# Patient Record
Sex: FEMALE | Race: WHITE | NOT HISPANIC OR LATINO | Employment: FULL TIME | URBAN - METROPOLITAN AREA
[De-identification: names, ages, dates, MRNs, and addresses within clinical notes are randomized per-mention and may not be internally consistent; named-entity substitution may affect disease eponyms.]

---

## 2017-05-04 ENCOUNTER — RADIANT APPOINTMENT (OUTPATIENT)
Dept: ULTRASOUND IMAGING | Facility: CLINIC | Age: 47
End: 2017-05-04
Payer: COMMERCIAL

## 2017-05-04 ENCOUNTER — OFFICE VISIT (OUTPATIENT)
Dept: OBGYN | Facility: CLINIC | Age: 47
End: 2017-05-04
Payer: COMMERCIAL

## 2017-05-04 ENCOUNTER — TELEPHONE (OUTPATIENT)
Dept: OBGYN | Facility: CLINIC | Age: 47
End: 2017-05-04

## 2017-05-04 VITALS
DIASTOLIC BLOOD PRESSURE: 84 MMHG | HEIGHT: 68 IN | SYSTOLIC BLOOD PRESSURE: 124 MMHG | BODY MASS INDEX: 30.77 KG/M2 | WEIGHT: 203 LBS

## 2017-05-04 VITALS
WEIGHT: 203 LBS | DIASTOLIC BLOOD PRESSURE: 84 MMHG | HEIGHT: 68 IN | SYSTOLIC BLOOD PRESSURE: 124 MMHG | BODY MASS INDEX: 30.77 KG/M2

## 2017-05-04 DIAGNOSIS — N92.1 MENORRHAGIA WITH IRREGULAR CYCLE: ICD-10-CM

## 2017-05-04 DIAGNOSIS — D25.1 FIBROIDS, INTRAMURAL: Primary | ICD-10-CM

## 2017-05-04 DIAGNOSIS — N92.1 MENORRHAGIA WITH IRREGULAR CYCLE: Primary | ICD-10-CM

## 2017-05-04 LAB
FSH SERPL-ACNC: 9.5 IU/L
HGB BLD-MCNC: 12.4 G/DL (ref 11.7–15.7)

## 2017-05-04 PROCEDURE — 99212 OFFICE O/P EST SF 10 MIN: CPT | Mod: 25 | Performed by: NURSE PRACTITIONER

## 2017-05-04 PROCEDURE — 85018 HEMOGLOBIN: CPT | Performed by: OBSTETRICS & GYNECOLOGY

## 2017-05-04 PROCEDURE — 36415 COLL VENOUS BLD VENIPUNCTURE: CPT | Performed by: OBSTETRICS & GYNECOLOGY

## 2017-05-04 PROCEDURE — 76830 TRANSVAGINAL US NON-OB: CPT | Performed by: OBSTETRICS & GYNECOLOGY

## 2017-05-04 PROCEDURE — 83001 ASSAY OF GONADOTROPIN (FSH): CPT | Performed by: OBSTETRICS & GYNECOLOGY

## 2017-05-04 PROCEDURE — 99203 OFFICE O/P NEW LOW 30 MIN: CPT | Performed by: OBSTETRICS & GYNECOLOGY

## 2017-05-04 RX ORDER — LISINOPRIL 20 MG/1
TABLET ORAL
Refills: 3 | COMMUNITY
Start: 2016-10-25 | End: 2017-11-17

## 2017-05-04 ASSESSMENT — ANXIETY QUESTIONNAIRES
3. WORRYING TOO MUCH ABOUT DIFFERENT THINGS: NOT AT ALL
GAD7 TOTAL SCORE: 0
2. NOT BEING ABLE TO STOP OR CONTROL WORRYING: NOT AT ALL
5. BEING SO RESTLESS THAT IT IS HARD TO SIT STILL: NOT AT ALL
7. FEELING AFRAID AS IF SOMETHING AWFUL MIGHT HAPPEN: NOT AT ALL
IF YOU CHECKED OFF ANY PROBLEMS ON THIS QUESTIONNAIRE, HOW DIFFICULT HAVE THESE PROBLEMS MADE IT FOR YOU TO DO YOUR WORK, TAKE CARE OF THINGS AT HOME, OR GET ALONG WITH OTHER PEOPLE: NOT DIFFICULT AT ALL
1. FEELING NERVOUS, ANXIOUS, OR ON EDGE: NOT AT ALL
6. BECOMING EASILY ANNOYED OR IRRITABLE: NOT AT ALL

## 2017-05-04 ASSESSMENT — PATIENT HEALTH QUESTIONNAIRE - PHQ9: 5. POOR APPETITE OR OVEREATING: NOT AT ALL

## 2017-05-04 NOTE — PROGRESS NOTES
SUBJECTIVE:                                                   Yesi Vaughan is a 46 year old female who presents to clinic today for the following health issue(s):  Patient presents with:  Abnormal Uterine Bleeding: last night had heavy bleeding with clots and has been starting to have irregular cycles starting 6 months ago        HPI:  Patient is seen today for heavy vaginal bleeding.  Patient states that she is a week or 10 days late for her.  Her previous period was .  Her cycles have become more irregular in the past year.  She has had 2 months this past year where she has skipped her period.  She has not been sexually active for some time.  She states there is no possibility of a pregnancy.  This morning she woke with heavy vaginal bleeding.  Passage of clots.  She was changing her pads every 30 minutes.  She now is changing her pad every 1-2 hours and they are not saturated.  The patient does not typically have symptoms prior to the start of her menses.  Her cycles are generally once a month.  Her flow is normal for 4-5 days.  Her last pelvic and Pap smear was    Patient's last menstrual period was 2017..   Patient is sexually active,   Using none for contraception.    reports that she has never smoked. She does not have any smokeless tobacco history on file.  STD testing offered?  Declined  Health maintenance updated:  Followed by primary care provider      Today's PHQ-2 Score: No flowsheet data found.  Today's PHQ-9 Score:   PHQ-9 SCORE 2017   Total Score 1     Today's CRISSY-7 Score:   CRISSY-7 SCORE 2017   Total Score 0       Problem list and histories reviewed & adjusted, as indicated.  Additional history: as documented.    There is no problem list on file for this patient.    Past Surgical History:   Procedure Laterality Date     NO HISTORY OF SURGERY        Social History   Substance Use Topics     Smoking status: Never Smoker     Smokeless tobacco: Not on file      "Alcohol use Not on file      Problem (# of Occurrences) Relation (Name,Age of Onset)    DIABETES (1) Maternal Grandfather    Hypertension (1) Mother    Lung Cancer (1) Father    OSTEOPOROSIS (1) Father    Skin Cancer (1) Mother            Current Outpatient Prescriptions   Medication Sig     Multiple Vitamins-Minerals (MULTIVITAMIN PO)      lisinopril (PRINIVIL/ZESTRIL) 20 MG tablet TAKE 1 TAB BY MOUTH DAILY.     No current facility-administered medications for this visit.      Allergies no known allergies    ROS:  12 point review of systems negative other than symptoms noted below.  Eyes: occular migraines  Genitourinary: Heavy Bleeding with Period, Irregular Menses, Urgency and burning with urination  Endocrine: Decreased Libido    OBJECTIVE:     /84  Ht 5' 8\" (1.727 m)  Wt 203 lb (92.1 kg)  LMP 05/02/2017  BMI 30.87 kg/m2  Body mass index is 30.87 kg/(m^2).    Exam:  Constitutional:  Appearance: Well nourished, well developed alert, in no acute distress  Neurologic/Psychiatric:  Mental Status:  Oriented X3   Pelvic  Exam deferred secondary to bleeding    In-Clinic Test Results:  No results found for this or any previous visit (from the past 24 hour(s)).    ASSESSMENT/PLAN:                                                        ICD-10-CM    1. Menorrhagia with irregular cycle N92.1 Hemoglobin     Follicle stimulating hormone     US Transvaginal Non OB           Plan: Patient will return to clinic for ultrasound examination.    He Hooker MD  [facility] [place]  "

## 2017-05-04 NOTE — PROGRESS NOTES
"    SUBJECTIVE:                                                   Yesi Vaughan is a 46 year old female who presents to clinic today for the following health issue(s):  Patient presents with:  Ultrasound: f/u cysts      HPI:  Pt with spotting after menses. F/u of ovarian cysts as well as hx of uterine fibroids    Patient's last menstrual period was 2017..   Patient is sexually active, .  Using none for contraception.    reports that she has never smoked. She does not have any smokeless tobacco history on file.    STD testing offered?  Declined    Health maintenance updated:  yes    Today's PHQ-2 Score: No flowsheet data found.  Today's PHQ-9 Score:   PHQ-9 SCORE 2017   Total Score 1     Today's CRISSY-7 Score:   CRISSY-7 SCORE 2017   Total Score 0       Problem list and histories reviewed & adjusted, as indicated.  Additional history: as documented.    There is no problem list on file for this patient.    Past Surgical History:   Procedure Laterality Date     NO HISTORY OF SURGERY        Social History   Substance Use Topics     Smoking status: Never Smoker     Smokeless tobacco: Not on file     Alcohol use Not on file      Problem (# of Occurrences) Relation (Name,Age of Onset)    DIABETES (1) Maternal Grandfather    Hypertension (1) Mother    Lung Cancer (1) Father    OSTEOPOROSIS (1) Father    Skin Cancer (1) Mother            Current Outpatient Prescriptions   Medication Sig     lisinopril (PRINIVIL/ZESTRIL) 20 MG tablet TAKE 1 TAB BY MOUTH DAILY.     Multiple Vitamins-Minerals (MULTIVITAMIN PO)      No current facility-administered medications for this visit.      No Known Allergies    ROS:  12 point review of systems negative other than symptoms noted below.    OBJECTIVE:     /84  Ht 5' 8\" (1.727 m)  Wt 203 lb (92.1 kg)  LMP 2017  BMI 30.87 kg/m2  Body mass index is 30.87 kg/(m^2).    Exam:  Constitutional:  Appearance: Well nourished, well developed alert, in no acute " distress  Neurologic/Psychiatric:  Mental Status:  Oriented X3   No Pelvic Exam performed   See exam by Dr. Hooker this morning  In-Clinic Test Results:  Results for orders placed or performed in visit on 05/04/17 (from the past 24 hour(s))   Hemoglobin   Result Value Ref Range    Hemoglobin 12.4 11.7 - 15.7 g/dL       ASSESSMENT/PLAN:                                                        ICD-10-CM    1. Fibroids, intramural D25.1        There are no Patient Instructions on file for this visit.    Pt with intramural fibroid. She is spotting when she uses the restroom. She does have a hx of fibroid from 2006 ultrasound where 2 fibroids were noted. Discussed pathophysiology and conservative vs aggressive approach. Pt to wait for FSH to come back. Unlikely that she is menopausal. Hemoglobin normal today. Will wait to hear back from JOHANN about FSH and decide what to do with fibroid.     CASTILLO Ramirez CNP  [facility]

## 2017-05-04 NOTE — MR AVS SNAPSHOT
"              After Visit Summary   5/4/2017    Yesi Vaughan    MRN: [id number]           Patient Information     Date Of Birth          1970        Visit Information        Provider Department      5/4/2017 11:30 AM He Hooker MD [facility]        Today's Diagnoses     Menorrhagia with irregular cycle    -  1       Follow-ups after your visit        Future tests that were ordered for you today     Open Future Orders        Priority Expected Expires Ordered    US Transvaginal Non OB Routine  5/5/2018 5/4/2017            Who to contact     If you have questions or need follow up information about today's clinic visit or your schedule please contact [facility] directly at [phone].  Normal or non-critical lab and imaging results will be communicated to you by MyChart, letter or phone within 4 business days after the clinic has received the results. If you do not hear from us within 7 days, please contact the clinic through [employer] or phone. If you have a critical or abnormal lab result, we will notify you by phone as soon as possible.  Submit refill requests through [employer] or call your pharmacy and they will forward the refill request to us. Please allow 3 business days for your refill to be completed.          Additional Information About Your Visit        MyChart Information     [employer] lets you send messages to your doctor, view your test results, renew your prescriptions, schedule appointments and more. To sign up, go to [email or web address] . Click on \"Log in\" on the left side of the screen, which will take you to the Welcome page. Then click on \"Sign up Now\" on the right side of the page.     You will be asked to enter the access code listed below, as well as some personal information. Please follow the directions to create your username and password.     Your access code is: [email or web address]  Expires: 8/2/2017 12:10 PM     Your access code " "will  in 90 days. If you need help or a new code, please call your [facility] clinic or [phone].        Care EveryWhere ID     This is your Care EveryWhere ID. This could be used by other organizations to access your [facility] medical records  [id number]        Your Vitals Were     Height Last Period BMI (Body Mass Index)             5' 8\" (1.727 m) 2017 30.87 kg/m2          Blood Pressure from Last 3 Encounters:   17 124/84    Weight from Last 3 Encounters:   17 203 lb (92.1 kg)              We Performed the Following     Follicle stimulating hormone     Hemoglobin        Primary Care Provider Office Phone # Fax #    Lyubov Faith [phone]       [facility] SM[facility] [address] [address]        Thank you!     Thank you for choosing [facility] [place]  for your care. Our goal is always to provide you with excellent care. Hearing back from our patients is one way we can continue to improve our services. Please take a few minutes to complete the written survey that you may receive in the mail after your visit with us. Thank you!             Your Updated Medication List - Protect others around you: Learn how to safely use, store and throw away your medicines at www.disposemymeds.org.          This list is accurate as of: 17 12:10 PM.  Always use your most recent med list.                   Brand Name Dispense Instructions for use    lisinopril 20 MG tablet    PRINIVIL/ZESTRIL     TAKE 1 TAB BY MOUTH DAILY.       MULTIVITAMIN PO            "

## 2017-05-05 ASSESSMENT — ANXIETY QUESTIONNAIRES: GAD7 TOTAL SCORE: 0

## 2017-05-05 ASSESSMENT — PATIENT HEALTH QUESTIONNAIRE - PHQ9: SUM OF ALL RESPONSES TO PHQ QUESTIONS 1-9: 1

## 2017-05-08 ENCOUNTER — OFFICE VISIT (OUTPATIENT)
Dept: OBGYN | Facility: CLINIC | Age: 47
End: 2017-05-08
Payer: COMMERCIAL

## 2017-05-08 VITALS
HEIGHT: 68 IN | WEIGHT: 203 LBS | DIASTOLIC BLOOD PRESSURE: 74 MMHG | SYSTOLIC BLOOD PRESSURE: 122 MMHG | BODY MASS INDEX: 30.77 KG/M2

## 2017-05-08 DIAGNOSIS — N92.0 MENORRHAGIA WITH REGULAR CYCLE: Primary | ICD-10-CM

## 2017-05-08 DIAGNOSIS — Z01.818 PRE-OP EXAM: ICD-10-CM

## 2017-05-08 DIAGNOSIS — R93.89 THICKENED ENDOMETRIUM: ICD-10-CM

## 2017-05-08 DIAGNOSIS — D25.1 INTRAMURAL LEIOMYOMA OF UTERUS: ICD-10-CM

## 2017-05-08 PROCEDURE — 99214 OFFICE O/P EST MOD 30 MIN: CPT | Performed by: OBSTETRICS & GYNECOLOGY

## 2017-05-08 RX ORDER — MISOPROSTOL 200 UG/1
200 TABLET ORAL ONCE
Qty: 1 TABLET | Refills: 0 | Status: SHIPPED | OUTPATIENT
Start: 2017-05-08 | End: 2017-05-08

## 2017-05-08 NOTE — PROGRESS NOTES
SUBJECTIVE:                                                   Yesi Vaughan is a 46 year old female who presents to clinic today for the following health issue(s):  Patient presents with:  Consult: Discuss  ultrasound      HPI:  Patient returns today for follow-up after her ultrasound and FSH.  Her FSH was 9.5.  The ultrasound showed a thickened endometrium 16.9 mm.  She had a small uterine fibroid measuring less than 2 cm.  Her bleeding has slowed down considerably.    Patient's last menstrual period was 2017..   Patient is sexually active, .  Using none for contraception.    reports that she has never smoked. She does not have any smokeless tobacco history on file.    STD testing offered?  Declined    Health maintenance updated:  no    Today's PHQ-2 Score: No flowsheet data found.  Today's PHQ-9 Score:   PHQ-9 SCORE 2017   Total Score 1     Today's CRISSY-7 Score:   CRISSY-7 SCORE 2017   Total Score 0       Problem list and histories reviewed & adjusted, as indicated.  Additional history: as documented.    There is no problem list on file for this patient.    Past Surgical History:   Procedure Laterality Date     NO HISTORY OF SURGERY        Social History   Substance Use Topics     Smoking status: Never Smoker     Smokeless tobacco: Not on file     Alcohol use Not on file      Problem (# of Occurrences) Relation (Name,Age of Onset)    DIABETES (1) Maternal Grandfather    Hypertension (1) Mother    Lung Cancer (1) Father    OSTEOPOROSIS (1) Father    Skin Cancer (1) Mother            Current Outpatient Prescriptions   Medication Sig     misoprostol (CYTOTEC) 200 MCG tablet Take 1 tablet (200 mcg) by mouth once for 1 dose     lisinopril (PRINIVIL/ZESTRIL) 20 MG tablet TAKE 1 TAB BY MOUTH DAILY.     Multiple Vitamins-Minerals (MULTIVITAMIN PO)      No current facility-administered medications for this visit.      No Known Allergies    ROS:  12 point review of systems negative other  "than symptoms noted below.    OBJECTIVE:     /74  Ht 5' 8\" (1.727 m)  Wt 203 lb (92.1 kg)  LMP 05/02/2017  BMI 30.87 kg/m2  Body mass index is 30.87 kg/(m^2).    Exam:  Constitutional:  Appearance: Well nourished, well developed alert, in no acute distress  Neck:  Lymph Nodes:  No lymphadenopathy present; Thyroid:  Gland size normal, nontender, no nodules or masses present on palpation  Chest:  Respiratory Effort:  Breathing unlabored  Cardiovascular: Heart: Auscultation:  Regular rate, normal rhythm, no murmurs present  Neurologic/Psychiatric:  Mental Status:  Oriented X3   No Pelvic Exam performed     In-Clinic Test Results:  No results found for this or any previous visit (from the past 24 hour(s)).    ASSESSMENT/PLAN:                                                        ICD-10-CM    1. Menorrhagia with regular cycle N92.0 misoprostol (CYTOTEC) 200 MCG tablet   2. Intramural leiomyoma of uterus D25.1    3. Thickened endometrium R93.8    4. Pre-op exam Z01.818            Plan: The patient be scheduled for surgery.  I reviewed the procedure with the patient.    He Hooker MD  [facility]    Amount of time needed for the procedure:  1 hour   Expected time off from work:  1 day  Surgeon:  He Hooker MD  Surgical Procedure:  Hysteroscopy with Myosure  Preop Needed:  Yes with  Myself  Location for surgery to performed:   Surgery Outpatient  Anesthesia:  General   No Known Allergies    DIAGNOSIS:  Thickened endometrium, menorrhagia    Special Instructions:            "

## 2017-05-08 NOTE — MR AVS SNAPSHOT
"              After Visit Summary   2017    Yesi Vaughan    MRN: [id number]           Patient Information     Date Of Birth          1970        Visit Information        Provider Department      2017 4:15 PM He Hooker MD [facility]        Today's Diagnoses     Menorrhagia with regular cycle    -  1    Intramural leiomyoma of uterus        Thickened endometrium        Pre-op exam           Follow-ups after your visit        Who to contact     If you have questions or need follow up information about today's clinic visit or your schedule please contact [facility] directly at [phone].  Normal or non-critical lab and imaging results will be communicated to you by [employer], letter or phone within 4 business days after the clinic has received the results. If you do not hear from us within 7 days, please contact the clinic through [employer] or phone. If you have a critical or abnormal lab result, we will notify you by phone as soon as possible.  Submit refill requests through [employer] or call your pharmacy and they will forward the refill request to us. Please allow 3 business days for your refill to be completed.          Additional Information About Your Visit        MyChart Information     [employer] lets you send messages to your doctor, view your test results, renew your prescriptions, schedule appointments and more. To sign up, go to [email or web address] . Click on \"Log in\" on the left side of the screen, which will take you to the Welcome page. Then click on \"Sign up Now\" on the right side of the page.     You will be asked to enter the access code listed below, as well as some personal information. Please follow the directions to create your username and password.     Your access code is: [email or web address]  Expires: 2017 12:10 PM     Your access code will  in 90 days. If you need help or a new code, please call your [facility] clinic " "or [phone].        Care EveryWhere ID     This is your Care EveryWhere ID. This could be used by other organizations to access your [facility] medical records  [id number]        Your Vitals Were     Height Last Period BMI (Body Mass Index)             5' 8\" (1.727 m) 05/02/2017 30.87 kg/m2          Blood Pressure from Last 3 Encounters:   05/08/17 122/74   05/04/17 124/84   05/04/17 124/84    Weight from Last 3 Encounters:   05/08/17 203 lb (92.1 kg)   05/04/17 203 lb (92.1 kg)   05/04/17 203 lb (92.1 kg)              Today, you had the following     No orders found for display         Today's Medication Changes          These changes are accurate as of: 5/8/17  5:02 PM.  If you have any questions, ask your nurse or doctor.               Start taking these medicines.        Dose/Directions    misoprostol 200 MCG tablet   Commonly known as:  CYTOTEC   Used for:  Menorrhagia with regular cycle   Started by:  He Hooker MD        Dose:  200 mcg   Take 1 tablet (200 mcg) by mouth once for 1 dose   Quantity:  1 tablet   Refills:  0            Where to get your medicines      These medications were sent to [place] [address] [place] [facility]  [address]  [address] [place] [address]     Phone:  [phone]     misoprostol 200 MCG tablet                Primary Care Provider Office Phone # Fax #    Lyubov Faith [phone]       [facility] [place] [address] [place] [address]        Thank you!     Thank you for choosing [facility] [place]  for your care. Our goal is always to provide you with excellent care. Hearing back from our patients is one way we can continue to improve our services. Please take a few minutes to complete the written survey that you may receive in the mail after your visit with us. Thank you!             Your Updated Medication List - Protect others around you: Learn how to safely use, store and throw away your medicines at " www.disposemymeds.org.          This list is accurate as of: 5/8/17  5:02 PM.  Always use your most recent med list.                   Brand Name Dispense Instructions for use    lisinopril 20 MG tablet    PRINIVIL/ZESTRIL     TAKE 1 TAB BY MOUTH DAILY.       misoprostol 200 MCG tablet    CYTOTEC    1 tablet    Take 1 tablet (200 mcg) by mouth once for 1 dose       MULTIVITAMIN PO

## 2017-05-08 NOTE — Clinical Note
Please abstract the following data from this visit with this patient into the appropriate field in Epic:  Mammogram done on this date: 06/01/2016 (approximately), by this group: [facility], results were normal.  Pap smear done on this date: 10/01/2016 (approximately), by this group: [facility], results were normal.

## 2017-05-10 ENCOUNTER — TELEPHONE (OUTPATIENT)
Dept: OBGYN | Facility: CLINIC | Age: 47
End: 2017-05-10

## 2017-05-10 NOTE — TELEPHONE ENCOUNTER
LVM for pt to call and sched SX    Noemi Padilla  Surgery Scheduler        Amount of time needed for the procedure: 1 hour   Expected time off from work: 1 day  Surgeon: He Hooker MD  Surgical Procedure:  Hysteroscopy with Myosure  Preop Needed: Yes with Myself  Location for surgery to performed: Surgery Outpatient  Anesthesia: General  No Known Allergies     DIAGNOSIS: Thickened endometrium, menorrhagia     Special Instructions:

## 2017-05-10 NOTE — TELEPHONE ENCOUNTER
Patient surgery scheduled on 5/17/2017 at 7:30am Check in 5:30am  Location for surgery to performed:   Surgery Outpatient  Scheduled by Cassie 5/10/2017     Information Packet given :Yes: MAILED 5/10/2017    CPT codes given: Yes    60079        Consents signed? N/A  Rep Informed :N/A    PREOP DATE :  5/8/2017  In Epic :Yes    On Spreadsheet :Yes    On Calendar EB  :No    In [place] Calendar JOAHNN  :Yes    Assist NA   Assist in Epic NA  Assist Notified as needed :No       Noemi Padilla  Surgery Scheduler

## 2017-05-12 NOTE — H&P (VIEW-ONLY)
SUBJECTIVE:                                                   Yesi Vaughan is a 46 year old female who presents to clinic today for the following health issue(s):  Patient presents with:  Consult: Discuss  ultrasound      HPI:  Patient returns today for follow-up after her ultrasound and FSH.  Her FSH was 9.5.  The ultrasound showed a thickened endometrium 16.9 mm.  She had a small uterine fibroid measuring less than 2 cm.  Her bleeding has slowed down considerably.    Patient's last menstrual period was 2017..   Patient is sexually active, .  Using none for contraception.    reports that she has never smoked. She does not have any smokeless tobacco history on file.    STD testing offered?  Declined    Health maintenance updated:  no    Today's PHQ-2 Score: No flowsheet data found.  Today's PHQ-9 Score:   PHQ-9 SCORE 2017   Total Score 1     Today's CRISSY-7 Score:   CRISSY-7 SCORE 2017   Total Score 0       Problem list and histories reviewed & adjusted, as indicated.  Additional history: as documented.    There is no problem list on file for this patient.    Past Surgical History:   Procedure Laterality Date     NO HISTORY OF SURGERY        Social History   Substance Use Topics     Smoking status: Never Smoker     Smokeless tobacco: Not on file     Alcohol use Not on file      Problem (# of Occurrences) Relation (Name,Age of Onset)    DIABETES (1) Maternal Grandfather    Hypertension (1) Mother    Lung Cancer (1) Father    OSTEOPOROSIS (1) Father    Skin Cancer (1) Mother            Current Outpatient Prescriptions   Medication Sig     misoprostol (CYTOTEC) 200 MCG tablet Take 1 tablet (200 mcg) by mouth once for 1 dose     lisinopril (PRINIVIL/ZESTRIL) 20 MG tablet TAKE 1 TAB BY MOUTH DAILY.     Multiple Vitamins-Minerals (MULTIVITAMIN PO)      No current facility-administered medications for this visit.      No Known Allergies    ROS:  12 point review of systems negative other  "than symptoms noted below.    OBJECTIVE:     /74  Ht 5' 8\" (1.727 m)  Wt 203 lb (92.1 kg)  LMP 05/02/2017  BMI 30.87 kg/m2  Body mass index is 30.87 kg/(m^2).    Exam:  Constitutional:  Appearance: Well nourished, well developed alert, in no acute distress  Neck:  Lymph Nodes:  No lymphadenopathy present; Thyroid:  Gland size normal, nontender, no nodules or masses present on palpation  Chest:  Respiratory Effort:  Breathing unlabored  Cardiovascular: Heart: Auscultation:  Regular rate, normal rhythm, no murmurs present  Neurologic/Psychiatric:  Mental Status:  Oriented X3   No Pelvic Exam performed     In-Clinic Test Results:  No results found for this or any previous visit (from the past 24 hour(s)).    ASSESSMENT/PLAN:                                                        ICD-10-CM    1. Menorrhagia with regular cycle N92.0 misoprostol (CYTOTEC) 200 MCG tablet   2. Intramural leiomyoma of uterus D25.1    3. Thickened endometrium R93.8    4. Pre-op exam Z01.818            Plan: The patient be scheduled for surgery.  I reviewed the procedure with the patient.    He Hooker MD  [facility]    Amount of time needed for the procedure:  1 hour   Expected time off from work:  1 day  Surgeon:  He Hooker MD  Surgical Procedure:  Hysteroscopy with Myosure  Preop Needed:  Yes with  Myself  Location for surgery to performed:   Surgery Outpatient  Anesthesia:  General   No Known Allergies    DIAGNOSIS:  Thickened endometrium, menorrhagia    Special Instructions:            "

## 2017-05-17 ENCOUNTER — HOSPITAL ENCOUNTER (OUTPATIENT)
Facility: CLINIC | Age: 47
Discharge: HOME | End: 2017-05-17
Attending: OBSTETRICS & GYNECOLOGY | Admitting: OBSTETRICS & GYNECOLOGY
Payer: COMMERCIAL

## 2017-05-17 ENCOUNTER — ANESTHESIA EVENT (OUTPATIENT)
Dept: SURGERY | Facility: CLINIC | Age: 47
End: 2017-05-17
Payer: COMMERCIAL

## 2017-05-17 ENCOUNTER — ANESTHESIA (OUTPATIENT)
Dept: SURGERY | Facility: CLINIC | Age: 47
End: 2017-05-17
Payer: COMMERCIAL

## 2017-05-17 VITALS
HEIGHT: 69 IN | RESPIRATION RATE: 14 BRPM | DIASTOLIC BLOOD PRESSURE: 73 MMHG | HEART RATE: 77 BPM | WEIGHT: 209 LBS | BODY MASS INDEX: 30.96 KG/M2 | OXYGEN SATURATION: 100 % | TEMPERATURE: 94.6 F | SYSTOLIC BLOOD PRESSURE: 116 MMHG

## 2017-05-17 DIAGNOSIS — G89.18 ACUTE POST-OPERATIVE PAIN: Primary | ICD-10-CM

## 2017-05-17 LAB — HCG SERPL QL: NEGATIVE

## 2017-05-17 PROCEDURE — 25000128 H RX IP 250 OP 636: Performed by: NURSE ANESTHETIST, CERTIFIED REGISTERED

## 2017-05-17 PROCEDURE — 36000056 ZZH SURGERY LEVEL 3 1ST 30 MIN: Performed by: OBSTETRICS & GYNECOLOGY

## 2017-05-17 PROCEDURE — 40000170 ZZH STATISTIC PRE-PROCEDURE ASSESSMENT II: Performed by: OBSTETRICS & GYNECOLOGY

## 2017-05-17 PROCEDURE — 84703 CHORIONIC GONADOTROPIN ASSAY: CPT | Performed by: ANESTHESIOLOGY

## 2017-05-17 PROCEDURE — 37000009 ZZH ANESTHESIA TECHNICAL FEE, EACH ADDTL 15 MIN: Performed by: OBSTETRICS & GYNECOLOGY

## 2017-05-17 PROCEDURE — 25000125 ZZHC RX 250: Performed by: NURSE ANESTHETIST, CERTIFIED REGISTERED

## 2017-05-17 PROCEDURE — 88305 TISSUE EXAM BY PATHOLOGIST: CPT | Performed by: OBSTETRICS & GYNECOLOGY

## 2017-05-17 PROCEDURE — 71000027 ZZH RECOVERY PHASE 2 EACH 15 MINS: Performed by: OBSTETRICS & GYNECOLOGY

## 2017-05-17 PROCEDURE — 25000128 H RX IP 250 OP 636: Performed by: OBSTETRICS & GYNECOLOGY

## 2017-05-17 PROCEDURE — 58558 HYSTEROSCOPY BIOPSY: CPT | Performed by: OBSTETRICS & GYNECOLOGY

## 2017-05-17 PROCEDURE — 27210794 ZZH OR GENERAL SUPPLY STERILE: Performed by: OBSTETRICS & GYNECOLOGY

## 2017-05-17 PROCEDURE — 36000058 ZZH SURGERY LEVEL 3 EA 15 ADDTL MIN: Performed by: OBSTETRICS & GYNECOLOGY

## 2017-05-17 PROCEDURE — 71000013 ZZH RECOVERY PHASE 1 LEVEL 1 EA ADDTL HR: Performed by: OBSTETRICS & GYNECOLOGY

## 2017-05-17 PROCEDURE — 36415 COLL VENOUS BLD VENIPUNCTURE: CPT | Performed by: ANESTHESIOLOGY

## 2017-05-17 PROCEDURE — 88305 TISSUE EXAM BY PATHOLOGIST: CPT | Mod: 26 | Performed by: OBSTETRICS & GYNECOLOGY

## 2017-05-17 PROCEDURE — 71000012 ZZH RECOVERY PHASE 1 LEVEL 1 FIRST HR: Performed by: OBSTETRICS & GYNECOLOGY

## 2017-05-17 PROCEDURE — 37000008 ZZH ANESTHESIA TECHNICAL FEE, 1ST 30 MIN: Performed by: OBSTETRICS & GYNECOLOGY

## 2017-05-17 RX ORDER — SODIUM CHLORIDE, SODIUM LACTATE, POTASSIUM CHLORIDE, CALCIUM CHLORIDE 600; 310; 30; 20 MG/100ML; MG/100ML; MG/100ML; MG/100ML
INJECTION, SOLUTION INTRAVENOUS CONTINUOUS
Status: DISCONTINUED | OUTPATIENT
Start: 2017-05-17 | End: 2017-05-17 | Stop reason: HOSPADM

## 2017-05-17 RX ORDER — CEFAZOLIN SODIUM 2 G/100ML
2 INJECTION, SOLUTION INTRAVENOUS
Status: COMPLETED | OUTPATIENT
Start: 2017-05-17 | End: 2017-05-17

## 2017-05-17 RX ORDER — ONDANSETRON 2 MG/ML
INJECTION INTRAMUSCULAR; INTRAVENOUS PRN
Status: DISCONTINUED | OUTPATIENT
Start: 2017-05-17 | End: 2017-05-17

## 2017-05-17 RX ORDER — HYDROCODONE BITARTRATE AND ACETAMINOPHEN 5; 325 MG/1; MG/1
1-2 TABLET ORAL EVERY 4 HOURS PRN
Qty: 20 TABLET | Refills: 0 | Status: SHIPPED | OUTPATIENT
Start: 2017-05-17 | End: 2017-06-08

## 2017-05-17 RX ORDER — FENTANYL CITRATE 50 UG/ML
INJECTION, SOLUTION INTRAMUSCULAR; INTRAVENOUS PRN
Status: DISCONTINUED | OUTPATIENT
Start: 2017-05-17 | End: 2017-05-17

## 2017-05-17 RX ORDER — IBUPROFEN 600 MG/1
600 TABLET, FILM COATED ORAL
Status: DISCONTINUED | OUTPATIENT
Start: 2017-05-17 | End: 2017-05-17 | Stop reason: HOSPADM

## 2017-05-17 RX ORDER — DEXAMETHASONE SODIUM PHOSPHATE 4 MG/ML
INJECTION, SOLUTION INTRA-ARTICULAR; INTRALESIONAL; INTRAMUSCULAR; INTRAVENOUS; SOFT TISSUE PRN
Status: DISCONTINUED | OUTPATIENT
Start: 2017-05-17 | End: 2017-05-17

## 2017-05-17 RX ORDER — LIDOCAINE HYDROCHLORIDE 20 MG/ML
INJECTION, SOLUTION INFILTRATION; PERINEURAL PRN
Status: DISCONTINUED | OUTPATIENT
Start: 2017-05-17 | End: 2017-05-17

## 2017-05-17 RX ORDER — EPHEDRINE SULFATE 50 MG/ML
INJECTION, SOLUTION INTRAMUSCULAR; INTRAVENOUS; SUBCUTANEOUS PRN
Status: DISCONTINUED | OUTPATIENT
Start: 2017-05-17 | End: 2017-05-17

## 2017-05-17 RX ORDER — KETOROLAC TROMETHAMINE 30 MG/ML
INJECTION, SOLUTION INTRAMUSCULAR; INTRAVENOUS PRN
Status: DISCONTINUED | OUTPATIENT
Start: 2017-05-17 | End: 2017-05-17

## 2017-05-17 RX ORDER — FENTANYL CITRATE 50 UG/ML
25-50 INJECTION, SOLUTION INTRAMUSCULAR; INTRAVENOUS
Status: DISCONTINUED | OUTPATIENT
Start: 2017-05-17 | End: 2017-05-17 | Stop reason: HOSPADM

## 2017-05-17 RX ORDER — CEFAZOLIN SODIUM 1 G/3ML
1 INJECTION, POWDER, FOR SOLUTION INTRAMUSCULAR; INTRAVENOUS SEE ADMIN INSTRUCTIONS
Status: DISCONTINUED | OUTPATIENT
Start: 2017-05-17 | End: 2017-05-17 | Stop reason: HOSPADM

## 2017-05-17 RX ORDER — PROPOFOL 10 MG/ML
INJECTION, EMULSION INTRAVENOUS CONTINUOUS PRN
Status: DISCONTINUED | OUTPATIENT
Start: 2017-05-17 | End: 2017-05-17

## 2017-05-17 RX ORDER — KETOROLAC TROMETHAMINE 30 MG/ML
30 INJECTION, SOLUTION INTRAMUSCULAR; INTRAVENOUS EVERY 6 HOURS PRN
Status: DISCONTINUED | OUTPATIENT
Start: 2017-05-17 | End: 2017-05-17 | Stop reason: HOSPADM

## 2017-05-17 RX ORDER — FENTANYL CITRATE 50 UG/ML
25-50 INJECTION, SOLUTION INTRAMUSCULAR; INTRAVENOUS EVERY 5 MIN PRN
Status: DISCONTINUED | OUTPATIENT
Start: 2017-05-17 | End: 2017-05-17 | Stop reason: HOSPADM

## 2017-05-17 RX ORDER — PROPOFOL 10 MG/ML
INJECTION, EMULSION INTRAVENOUS PRN
Status: DISCONTINUED | OUTPATIENT
Start: 2017-05-17 | End: 2017-05-17

## 2017-05-17 RX ORDER — ONDANSETRON 2 MG/ML
4 INJECTION INTRAMUSCULAR; INTRAVENOUS EVERY 30 MIN PRN
Status: DISCONTINUED | OUTPATIENT
Start: 2017-05-17 | End: 2017-05-17 | Stop reason: HOSPADM

## 2017-05-17 RX ORDER — SODIUM CHLORIDE, SODIUM LACTATE, POTASSIUM CHLORIDE, CALCIUM CHLORIDE 600; 310; 30; 20 MG/100ML; MG/100ML; MG/100ML; MG/100ML
INJECTION, SOLUTION INTRAVENOUS CONTINUOUS PRN
Status: DISCONTINUED | OUTPATIENT
Start: 2017-05-17 | End: 2017-05-17

## 2017-05-17 RX ORDER — NALOXONE HYDROCHLORIDE 0.4 MG/ML
.1-.4 INJECTION, SOLUTION INTRAMUSCULAR; INTRAVENOUS; SUBCUTANEOUS
Status: DISCONTINUED | OUTPATIENT
Start: 2017-05-17 | End: 2017-05-17 | Stop reason: HOSPADM

## 2017-05-17 RX ORDER — HYDROMORPHONE HYDROCHLORIDE 1 MG/ML
.3-.5 INJECTION, SOLUTION INTRAMUSCULAR; INTRAVENOUS; SUBCUTANEOUS EVERY 10 MIN PRN
Status: DISCONTINUED | OUTPATIENT
Start: 2017-05-17 | End: 2017-05-17 | Stop reason: HOSPADM

## 2017-05-17 RX ORDER — IBUPROFEN 600 MG/1
600 TABLET, FILM COATED ORAL EVERY 6 HOURS PRN
Qty: 30 TABLET | Refills: 0 | Status: SHIPPED | OUTPATIENT
Start: 2017-05-17 | End: 2017-12-13

## 2017-05-17 RX ORDER — MEPERIDINE HYDROCHLORIDE 25 MG/ML
12.5 INJECTION INTRAMUSCULAR; INTRAVENOUS; SUBCUTANEOUS
Status: DISCONTINUED | OUTPATIENT
Start: 2017-05-17 | End: 2017-05-17 | Stop reason: HOSPADM

## 2017-05-17 RX ORDER — ONDANSETRON 4 MG/1
4 TABLET, ORALLY DISINTEGRATING ORAL EVERY 30 MIN PRN
Status: DISCONTINUED | OUTPATIENT
Start: 2017-05-17 | End: 2017-05-17 | Stop reason: HOSPADM

## 2017-05-17 RX ORDER — HYDROCODONE BITARTRATE AND ACETAMINOPHEN 5; 325 MG/1; MG/1
1-2 TABLET ORAL
Status: DISCONTINUED | OUTPATIENT
Start: 2017-05-17 | End: 2017-05-17 | Stop reason: HOSPADM

## 2017-05-17 RX ADMIN — DEXAMETHASONE SODIUM PHOSPHATE 4 MG: 4 INJECTION, SOLUTION INTRA-ARTICULAR; INTRALESIONAL; INTRAMUSCULAR; INTRAVENOUS; SOFT TISSUE at 07:38

## 2017-05-17 RX ADMIN — Medication 7.5 MG: at 07:44

## 2017-05-17 RX ADMIN — FENTANYL CITRATE 50 MCG: 50 INJECTION, SOLUTION INTRAMUSCULAR; INTRAVENOUS at 07:42

## 2017-05-17 RX ADMIN — PROPOFOL 200 MCG/KG/MIN: 10 INJECTION, EMULSION INTRAVENOUS at 07:30

## 2017-05-17 RX ADMIN — PROPOFOL 200 MG: 10 INJECTION, EMULSION INTRAVENOUS at 07:30

## 2017-05-17 RX ADMIN — LIDOCAINE HYDROCHLORIDE 100 MG: 20 INJECTION, SOLUTION INFILTRATION; PERINEURAL at 07:30

## 2017-05-17 RX ADMIN — KETOROLAC TROMETHAMINE 30 MG: 30 INJECTION, SOLUTION INTRAMUSCULAR at 07:53

## 2017-05-17 RX ADMIN — SODIUM CHLORIDE, POTASSIUM CHLORIDE, SODIUM LACTATE AND CALCIUM CHLORIDE: 600; 310; 30; 20 INJECTION, SOLUTION INTRAVENOUS at 07:30

## 2017-05-17 RX ADMIN — ONDANSETRON 4 MG: 2 INJECTION INTRAMUSCULAR; INTRAVENOUS at 07:38

## 2017-05-17 RX ADMIN — MIDAZOLAM HYDROCHLORIDE 2 MG: 1 INJECTION, SOLUTION INTRAMUSCULAR; INTRAVENOUS at 07:30

## 2017-05-17 RX ADMIN — CEFAZOLIN SODIUM 2 G: 2 INJECTION, SOLUTION INTRAVENOUS at 07:34

## 2017-05-17 RX ADMIN — FENTANYL CITRATE 50 MCG: 50 INJECTION, SOLUTION INTRAMUSCULAR; INTRAVENOUS at 07:30

## 2017-05-17 NOTE — DISCHARGE INSTRUCTIONS
Same Day Surgery Discharge Instructions for  Sedation and General Anesthesia       It's not unusual to feel dizzy, light-headed or faint for up to 24 hours after surgery or while taking pain medication.  If you have these symptoms: sit for a few minutes before standing and have someone assist you when you get up to walk or use the bathroom.      You should rest and relax for the next 24 hours. We recommend you make arrangements to have an adult stay with you for at least 24 hours after your discharge.  Avoid hazardous and strenuous activity.      DO NOT DRIVE any vehicle or operate mechanical equipment for 24 hours following the end of your surgery.  Even though you may feel normal, your reactions may be affected by the medication you have received.      Do not drink alcoholic beverages for 24 hours following surgery.       Slowly progress to your regular diet as you feel able. It's not unusual to feel nauseated and/or vomit after receiving anesthesia.  If you develop these symptoms, drink clear liquids (apple juice, ginger ale, broth, 7-up, etc. ) until you feel better.  If your nausea and vomiting persists for 24 hours, please notify your surgeon.        All narcotic pain medications, along with inactivity and anesthesia, can cause constipation. Drinking plenty of liquids and increasing fiber intake will help.      For any questions of a medical nature, call your surgeon.      Do not make important decisions for 24 hours.      If you had general anesthesia, you may have a sore throat for a couple of days related to the breathing tube used during surgery.  You may use Cepacol lozenges to help with this discomfort.  If it worsens or if you develop a fever, contact your surgeon.       If you feel your pain is not well managed with the pain medications prescribed by your surgeon, please contact your surgeon's office to let them know so they can address your concerns.       While you were at the hospital today you  received Toradol, an antiinflammatory medication similar to Ibuprofen.  You should not take other antiinflammatory medication, such as Ibuprofen, Motrin, Advil, Aleve, Naprosyn, etc, until 2pm.    [facility]  Discharge Instructions  Following D & C / Hysteroscopy    Activity  You may resume normal activities including lifting as needed.  It is permissible to climb stairs. You may drive a car after 24 hours as long as you are not taking narcotic pain pills.   Baths or showers are perfectly acceptable.     Vaginal Discharge  You may have some vaginal bleeding or discharge for about a week after procedure.  You may use tampons or pads.    Temperature  If you develop temperature elevations to over 101  Fahrenheit, your physician should be called immediately.    Diet  [place] or light diet is advisable the day of surgery.  If nausea persists, continue this diet.  If severe, call.    Follow-up  Make an appointment in 1-2 weeks if instructed to at: [phone]        [facility]  [address] [place] [address]  [phone]                             Kobe García M.D.               He Hooker M.D.

## 2017-05-17 NOTE — BRIEF OP NOTE
[facility] Brief Operative Note    Pre-operative diagnosis: MENORRHAGIA,THICKENED ENDOMETRIUM   Post-operative diagnosis SAME, ENDOMETRIAL POLYPS   Procedure: Procedure(s):  OPERATIVE HYSTEROSCOPY WITH MYOSURE MORCELLATOR - Wound Class: II-Clean Contaminated   Surgeon(s): Surgeon(s) and Role:     * He Hooker MD - Primary   Estimated blood loss: 2 ml   Specimens: Endometrial polyps   Findings: Uterus sounded to 8 cm.  Several large polyps excised

## 2017-05-17 NOTE — ANESTHESIA CARE TRANSFER NOTE
Patient: Yesi Vaughan    Procedure(s):  OPERATIVE HYSTEROSCOPY WITH MYOSURE MORCELLATOR - Wound Class: II-Clean Contaminated    Diagnosis: MENORRHAGIA,THICKENED ENDOMETRIUM  Diagnosis Additional Information: No value filed.    Anesthesia Type:   General, LMA     Note:  Airway :LMA  Patient transferred to:PACU  Comments: Transferred to PACU, spontaneous respirations via LMA with 10L O2.  Connected to wall O2 in PACU. LMA removed atraumatically in PACU as patient opened eyes and woke up. All monitors and alarms on and functioning, clinically stable vital signs. Report given to PACU RN and questions answered.       Vitals: (Last set prior to Anesthesia Care Transfer)    CRNA VITALS  5/17/2017 0733 - 5/17/2017 0811      5/17/2017             Resp Rate (observed): (!)  1    Resp Rate (set): 10                Electronically Signed By: CASTILLO Harmon CRNA  May 17, 2017  8:11 AM

## 2017-05-17 NOTE — ANESTHESIA POSTPROCEDURE EVALUATION
Patient: Yesi Vaughan    Procedure(s):  OPERATIVE HYSTEROSCOPY WITH MYOSURE MORCELLATOR - Wound Class: II-Clean Contaminated    Diagnosis:MENORRHAGIA,THICKENED ENDOMETRIUM  Diagnosis Additional Information: No value filed.    Anesthesia Type:  General, LMA    Note:  Anesthesia Post Evaluation    Patient location during evaluation: PACU  Patient participation: Able to fully participate in evaluation  Level of consciousness: awake  Pain management: adequate  Airway patency: patent  Cardiovascular status: acceptable  Respiratory status: acceptable  Hydration status: acceptable  PONV: none     Anesthetic complications: None          Last vitals:  Vitals:    05/17/17 0808 05/17/17 0815 05/17/17 0830   BP: 98/62 105/65 103/70   Pulse: 77     Resp: 16 13 16   Temp: 35.7  C (96.2  F) 34.8  C (94.6  F) 34.8  C (94.6  F)   SpO2: 99% 99% 100%         Electronically Signed By: Aris Church MD  May 17, 2017  9:57 AM

## 2017-05-17 NOTE — OP NOTE
DATE OF PROCEDURE:  2017      PREOPERATIVE DIAGNOSES:   1.  Menorrhagia.   2.  Thickened endometrium.      POSTOPERATIVE DIAGNOSES:  Endometrial polyps, final pathology pending.      PROCEDURE:  Hysteroscopy with MyoSure resection of endometrial polyp.      SURGEON:  Shayan Hooker MD      ANESTHESIA:  General.      INDICATION:  Amarilis Lloyd is a 46-year-old white female,  2, para 1 who was seen for menorrhagia.  Ultrasound showed a thickened endometrium of 19 mm, consistent with probable polyps.      DESCRIPTION OF PROCEDURE:  Under general anesthesia, the patient was placed in lithotomy position and prepped and draped in the usual fashion.  Speculum was placed in the vagina, anterior lip of cervix grasped with a tenaculum.  Uterus probed, sounded to 8 cm.  Cervix was dilated through #7 [place].  Using the MyoSure hysteroscopic device, the cavity was explored with findings of several large polyps.  These were all resected.  Additional endometrium was removed.  At the end of the case, a sharp curettage revealed a normal smooth cavity.  Estimated blood loss was 2 mL.         SHAYAN HOOKER MD             D: 2017 07:55   T: 2017 12:09   MT: DIETER#[id number]      Name:     AMARILIS LLOYD   MRN:      [id number]        Account:        [id number]   :      1970           Procedure Date: 2017      Document: [id number]

## 2017-05-17 NOTE — IP AVS SNAPSHOT
MRN:[id number]                      After Visit Summary   5/17/2017    Yesi Vaughan    MRN: [id number]           Thank you!     Thank you for choosing [place] for your care. Our goal is always to provide you with excellent care. Hearing back from our patients is one way we can continue to improve our services. Please take a few minutes to complete the written survey that you may receive in the mail after you visit with us. Thank you!        Patient Information     Date Of Birth          1970        About your hospital stay     You were admitted on:  May 17, 2017 [facility]    You were discharged on:  May 17, 2017       Who to Call     For medical emergencies, please call 911.  For non-urgent questions about your medical care, please call your primary care provider or clinic, [phone]  For questions related to your surgery, please call your surgery clinic        Attending Provider     Provider Specialty    He Hooker MD OB/Gyn       Primary Care Provider Office Phone # Fax #    Lyubov Faith [phone]       [facility] [place] [address] [place] [address]        After Care Instructions     Discharge Instructions       Patient to arrange follow up appointment in 2-3  weeks                  Further instructions from your care team       Same Day Surgery Discharge Instructions for  Sedation and General Anesthesia       It's not unusual to feel dizzy, light-headed or faint for up to 24 hours after surgery or while taking pain medication.  If you have these symptoms: sit for a few minutes before standing and have someone assist you when you get up to walk or use the bathroom.      You should rest and relax for the next 24 hours. We recommend you make arrangements to have an adult stay with you for at least 24 hours after your discharge.  Avoid hazardous and strenuous activity.      DO  NOT DRIVE any vehicle or operate mechanical equipment for 24 hours following the end of your surgery.  Even though you may feel normal, your reactions may be affected by the medication you have received.      Do not drink alcoholic beverages for 24 hours following surgery.       Slowly progress to your regular diet as you feel able. It's not unusual to feel nauseated and/or vomit after receiving anesthesia.  If you develop these symptoms, drink clear liquids (apple juice, ginger ale, broth, 7-up, etc. ) until you feel better.  If your nausea and vomiting persists for 24 hours, please notify your surgeon.        All narcotic pain medications, along with inactivity and anesthesia, can cause constipation. Drinking plenty of liquids and increasing fiber intake will help.      For any questions of a medical nature, call your surgeon.      Do not make important decisions for 24 hours.      If you had general anesthesia, you may have a sore throat for a couple of days related to the breathing tube used during surgery.  You may use Cepacol lozenges to help with this discomfort.  If it worsens or if you develop a fever, contact your surgeon.       If you feel your pain is not well managed with the pain medications prescribed by your surgeon, please contact your surgeon's office to let them know so they can address your concerns.       While you were at the hospital today you received Toradol, an antiinflammatory medication similar to Ibuprofen.  You should not take other antiinflammatory medication, such as Ibuprofen, Motrin, Advil, Aleve, Naprosyn, etc, until 2pm.    [facility]  Discharge Instructions  Following D & C / Hysteroscopy    Activity  You may resume normal activities including lifting as needed.  It is permissible to climb stairs. You may drive a car after 24 hours as long as you are not taking narcotic pain pills.   Baths or showers are perfectly acceptable.     Vaginal Discharge  You may have  "some vaginal bleeding or discharge for about a week after procedure.  You may use tampons or pads.    Temperature  If you develop temperature elevations to over 101  Fahrenheit, your physician should be called immediately.    Diet  [place] or light diet is advisable the day of surgery.  If nausea persists, continue this diet.  If severe, call.    Follow-up  Make an appointment in 1-2 weeks if instructed to at: [phone]        [facility]  [address] [place] [address]  [phone]                             Kobe García M.D.               He Hooker M.D.                                                                   Pending Results     Date and Time Order Name Status Description    2017 0754 Surgical pathology exam In process             Admission Information     Date & Time Provider Department Dept. Phone    2017 He Hooker MD [facility] Same Day Surgery [phone]      Your Vitals Were     Blood Pressure Pulse Temperature Respirations Height Weight    103/70 77 94.6  F (34.8  C) 16 1.753 m (5' 9\") 94.8 kg (209 lb)    Last Period Pulse Oximetry BMI (Body Mass Index)             2017 (Approximate) 100% 30.86 kg/m2         [employer] Information     [employer] lets you send messages to your doctor, view your test results, renew your prescriptions, schedule appointments and more. To sign up, go to [email or web address] . Click on \"Log in\" on the left side of the screen, which will take you to the Welcome page. Then click on \"Sign up Now\" on the right side of the page.     You will be asked to enter the access code listed below, as well as some personal information. Please follow the directions to create your username and password.     Your access code is: [email or web address]  Expires: 2017 12:10 PM     Your access code will  in 90 days. If you need help or a new code, please call your [place] clinic or " [phone].        Care EveryWhere ID     This is your Care EveryWhere ID. This could be used by other organizations to access your [facility] medical records  [id number]           Review of your medicines      START taking        Dose / Directions    HYDROcodone-acetaminophen 5-325 MG per tablet   Commonly known as:  NORCO   Used for:  Acute post-operative pain        Dose:  1-2 tablet   Take 1-2 tablets by mouth every 4 hours as needed for other (Moderate to Severe Pain)   Quantity:  20 tablet   Refills:  0       ibuprofen 600 MG tablet   Commonly known as:  ADVIL/MOTRIN   Used for:  Acute post-operative pain        Dose:  600 mg   Take 1 tablet (600 mg) by mouth every 6 hours as needed for pain (mild)   Quantity:  30 tablet   Refills:  0         CONTINUE these medicines which have NOT CHANGED        Dose / Directions    lisinopril 20 MG tablet   Commonly known as:  PRINIVIL/ZESTRIL        TAKE 1 TAB BY MOUTH DAILY.   Refills:  3       MISOPROSTOL PO        Dose:  200 mcg   Take 200 mcg by mouth once   Refills:  0       MULTIVITAMIN PO        Refills:  0            Where to get your medicines      These medications were sent to [facility] [place], [place] - [address]  [address], [place] [address]     Phone:  [phone]     ibuprofen 600 MG tablet         Some of these will need a paper prescription and others can be bought over the counter. Ask your nurse if you have questions.     Bring a paper prescription for each of these medications     HYDROcodone-acetaminophen 5-325 MG per tablet                Protect others around you: Learn how to safely use, store and throw away your medicines at www.disposemymeds.org.             Medication List: This is a list of all your medications and when to take them. Check marks below indicate your daily home schedule. Keep this list as a reference.      Medications           Morning Afternoon Evening Bedtime As Needed     HYDROcodone-acetaminophen 5-325 MG per tablet   Commonly known as:  NORCO   Take 1-2 tablets by mouth every 4 hours as needed for other (Moderate to Severe Pain)                                ibuprofen 600 MG tablet   Commonly known as:  ADVIL/MOTRIN   Take 1 tablet (600 mg) by mouth every 6 hours as needed for pain (mild)                                lisinopril 20 MG tablet   Commonly known as:  PRINIVIL/ZESTRIL   TAKE 1 TAB BY MOUTH DAILY.                                MISOPROSTOL PO   Take 200 mcg by mouth once                                MULTIVITAMIN PO

## 2017-05-17 NOTE — ANESTHESIA PREPROCEDURE EVALUATION
Anesthesia Evaluation     . Pt has not had prior anesthetic            ROS/MED HX    ENT/Pulmonary:     (+)sleep apnea, , . .    Neurologic:     (+)migraines,     Cardiovascular:     (+) hypertension----. : . . . :. .       METS/Exercise Tolerance:     Hematologic:         Musculoskeletal:         GI/Hepatic:     (+) GERD       Renal/Genitourinary:         Endo:     (+) thyroid problem  Thyroid disease - Other cyst, Obesity, .      Psychiatric:         Infectious Disease:         Malignancy:         Other:                     Physical Exam  Normal systems: cardiovascular, pulmonary and dental    Airway   Mallampati: II  TM distance: >3 FB  Neck ROM: full    Dental     Cardiovascular   Rhythm and rate: regular and normal      Pulmonary    breath sounds clear to auscultation                    Anesthesia Plan      History & Physical Review  History and physical reviewed and following examination; no interval change.    ASA Status:  2 .    NPO Status:  > 8 hours    Plan for General and LMA with Propofol induction.   PONV prophylaxis:  Ondansetron (or other 5HT-3) and Dexamethasone or Solumedrol       Postoperative Care  Postoperative pain management:  IV analgesics and Oral pain medications.      Consents  Anesthetic plan, risks, benefits and alternatives discussed with:  Patient..                          .

## 2017-05-17 NOTE — IP AVS SNAPSHOT
[facility] Same Day Surgery    [address]    [place] [address]    Phone:  [phone]    Fax:  [phone]                                       After Visit Summary   5/17/2017    Yesi Vaughan    MRN: [id number]           After Visit Summary Signature Page     I have received my discharge instructions, and my questions have been answered. I have discussed any challenges I see with this plan with the nurse or doctor.    ..........................................................................................................................................  Patient/Patient Representative Signature      ..........................................................................................................................................  Patient Representative Print Name and Relationship to Patient    ..................................................               ................................................  Date                                            Time    ..........................................................................................................................................  Reviewed by Signature/Title    ...................................................              ..............................................  Date                                                            Time

## 2017-05-18 LAB — COPATH REPORT: NORMAL

## 2017-06-08 ENCOUNTER — OFFICE VISIT (OUTPATIENT)
Dept: OBGYN | Facility: CLINIC | Age: 47
End: 2017-06-08
Payer: COMMERCIAL

## 2017-06-08 VITALS
WEIGHT: 204 LBS | HEIGHT: 69 IN | BODY MASS INDEX: 30.21 KG/M2 | DIASTOLIC BLOOD PRESSURE: 78 MMHG | SYSTOLIC BLOOD PRESSURE: 122 MMHG

## 2017-06-08 DIAGNOSIS — Z48.816 AFTERCARE FOLLOWING SURGERY OF THE GENITOURINARY SYSTEM: Primary | ICD-10-CM

## 2017-06-08 DIAGNOSIS — N84.0 ENDOMETRIAL POLYP: Primary | ICD-10-CM

## 2017-06-08 LAB — HGB BLD-MCNC: 12.2 G/DL (ref 11.7–15.7)

## 2017-06-08 PROCEDURE — 85018 HEMOGLOBIN: CPT | Performed by: OBSTETRICS & GYNECOLOGY

## 2017-06-08 PROCEDURE — 99213 OFFICE O/P EST LOW 20 MIN: CPT | Performed by: OBSTETRICS & GYNECOLOGY

## 2017-06-08 PROCEDURE — 36415 COLL VENOUS BLD VENIPUNCTURE: CPT | Performed by: OBSTETRICS & GYNECOLOGY

## 2017-06-08 NOTE — MR AVS SNAPSHOT
"              After Visit Summary   2017    Yesi Vaughan    MRN: [id number]           Patient Information     Date Of Birth          1970        Visit Information        Provider Department      2017 10:45 AM He Hooker MD [facility]        Today's Diagnoses     Endometrial polyp    -  1       Follow-ups after your visit        Who to contact     If you have questions or need follow up information about today's clinic visit or your schedule please contact [facility] directly at [phone].  Normal or non-critical lab and imaging results will be communicated to you by [employer], letter or phone within 4 business days after the clinic has received the results. If you do not hear from us within 7 days, please contact the clinic through [employer] or phone. If you have a critical or abnormal lab result, we will notify you by phone as soon as possible.  Submit refill requests through [employer] or call your pharmacy and they will forward the refill request to us. Please allow 3 business days for your refill to be completed.          Additional Information About Your Visit        MyChart Information     [employer] lets you send messages to your doctor, view your test results, renew your prescriptions, schedule appointments and more. To sign up, go to [email or web address] . Click on \"Log in\" on the left side of the screen, which will take you to the Welcome page. Then click on \"Sign up Now\" on the right side of the page.     You will be asked to enter the access code listed below, as well as some personal information. Please follow the directions to create your username and password.     Your access code is: [email or web address]  Expires: 2017 12:10 PM     Your access code will  in 90 days. If you need help or a new code, please call your [place] clinic or [phone].        Care EveryWhere ID     This is your Care EveryWhere ID. This could be used " "by other organizations to access your [facility] medical records  [id number]        Your Vitals Were     Height Last Period BMI (Body Mass Index)             5' 9\" (1.753 m) 05/26/2017 (Approximate) 30.13 kg/m2          Blood Pressure from Last 3 Encounters:   06/08/17 122/78   05/17/17 116/73   05/08/17 122/74    Weight from Last 3 Encounters:   06/08/17 204 lb (92.5 kg)   05/17/17 209 lb (94.8 kg)   05/08/17 203 lb (92.1 kg)              Today, you had the following     No orders found for display       Primary Care Provider Office Phone # Fax #    Lyubov Faith [phone]       [facility] [address] [address]        Thank you!     Thank you for choosing [facility] [place]  for your care. Our goal is always to provide you with excellent care. Hearing back from our patients is one way we can continue to improve our services. Please take a few minutes to complete the written survey that you may receive in the mail after your visit with us. Thank you!             Your Updated Medication List - Protect others around you: Learn how to safely use, store and throw away your medicines at www.disposemymeds.org.          This list is accurate as of: 6/8/17 11:00 AM.  Always use your most recent med list.                   Brand Name Dispense Instructions for use    ibuprofen 600 MG tablet    ADVIL/MOTRIN    30 tablet    Take 1 tablet (600 mg) by mouth every 6 hours as needed for pain (mild)       lisinopril 20 MG tablet    PRINIVIL/ZESTRIL     TAKE 1 TAB BY MOUTH DAILY.       MULTIVITAMIN PO            "

## 2017-06-08 NOTE — PROGRESS NOTES
"SUBJECTIVE:                                                      Yesi Vaughan is a 46 year old female who presents today as a post-op following a hysteroscopy with resection of endometrial polyps.  Pathology was all benign.  Patient has had one menses since the procedure.  Her last menses was heavier than normal.    OBJECTIVE:                                                    Objective  Vitals: /78  Ht 5' 9\" (1.753 m)  Wt 204 lb (92.5 kg)  LMP 05/26/2017 (Approximate)  BMI 30.13 kg/m2  BMI= Body mass index is 30.13 kg/(m^2).    GENERAL: healthy, alert and no distress  PSYCH: mentation appears normal, affect normal/bright    Hemoglobin 12 grams      ASSESSMENT AND PLAN:                                                    Assessment: Status post hysteroscopic resection of endometrial polyps.    Plan: The patient was given information on endometrial ablation.  If her menses continue to be heavy and she considers it a problem she will contact me about this possible endometrial ablation in the office.      He Hooker MD                    "

## 2017-08-22 LAB — PAP-ABSTRACT: NORMAL

## 2017-11-06 ENCOUNTER — OFFICE VISIT (OUTPATIENT)
Dept: OBGYN | Facility: CLINIC | Age: 47
End: 2017-11-06
Payer: COMMERCIAL

## 2017-11-06 VITALS
HEART RATE: 72 BPM | HEIGHT: 69 IN | BODY MASS INDEX: 30.96 KG/M2 | WEIGHT: 209 LBS | DIASTOLIC BLOOD PRESSURE: 76 MMHG | SYSTOLIC BLOOD PRESSURE: 122 MMHG

## 2017-11-06 DIAGNOSIS — N92.1 MENORRHAGIA WITH IRREGULAR CYCLE: Primary | ICD-10-CM

## 2017-11-06 PROCEDURE — 99213 OFFICE O/P EST LOW 20 MIN: CPT | Performed by: OBSTETRICS & GYNECOLOGY

## 2017-11-06 NOTE — PROGRESS NOTES
SUBJECTIVE:                                                   Yesi Vaughan is a 47 year old female who presents to clinic today for the following health issue(s):  Patient presents with:  Abnormal Bleeding Problem: Didn't get her period in October, then on  her period was heavy with clotting      HPI:  Patient seen today complaining of heavy menses.  Patient had regular menstrual cycles in  and September.  She did not have a period on October.  On November 3 her menses started in its been very heavy with passing of clots.  It reminders her of last spring when she had heavy menstrual bleeding was found to have a thickened endometrium with multiple endometrial polyps.  She is not having any significant cramping.  The bleeding is tolerable not affecting her normal daily activities.    Patient's last menstrual period was 2017 (exact date)..   Patient is not sexually active, .  Using not sexually active for contraception.    reports that she has never smoked. She has never used smokeless tobacco.      STD testing offered?  Declined    Health maintenance updated:  yes    Today's PHQ-2 Score: No flowsheet data found.  Today's PHQ-9 Score:   PHQ-9 SCORE 2017   Total Score 1     Today's CRISSY-7 Score:   CRISSY-7 SCORE 2017   Total Score 0       Problem list and histories reviewed & adjusted, as indicated.  Additional history: as documented.    There is no problem list on file for this patient.    Past Surgical History:   Procedure Laterality Date     AS ESOPHAGOSCOPY, DIAGNOSTIC       NO HISTORY OF SURGERY       OPERATIVE HYSTEROSCOPY WITH MORCELLATOR N/A 2017    Procedure: OPERATIVE HYSTEROSCOPY WITH MORCELLATOR (MYOSURE);  OPERATIVE HYSTEROSCOPY WITH MYOSURE MORCELLATOR;  Surgeon: He Hooker MD;  Location: [facility]     wisdom teeth        Social History   Substance Use Topics     Smoking status: Never Smoker     Smokeless tobacco: Never Used     Alcohol  "use No      Problem (# of Occurrences) Relation (Name,Age of Onset)    DIABETES (1) Maternal Grandfather    Hypertension (1) Mother    Lung Cancer (1) Father    OSTEOPOROSIS (1) Father    Skin Cancer (1) Mother            Current Outpatient Prescriptions   Medication Sig     lisinopril (PRINIVIL/ZESTRIL) 20 MG tablet TAKE 1 TAB BY MOUTH DAILY.     Multiple Vitamins-Minerals (MULTIVITAMIN PO)      ibuprofen (ADVIL/MOTRIN) 600 MG tablet Take 1 tablet (600 mg) by mouth every 6 hours as needed for pain (mild) (Patient not taking: Reported on 6/8/2017)     No current facility-administered medications for this visit.      No Known Allergies    ROS:  12 point review of systems negative other than symptoms noted below.  Genitourinary: Heavy Bleeding with Period    OBJECTIVE:     /76  Pulse 72  Ht 5' 9\" (1.753 m)  Wt 209 lb (94.8 kg)  LMP 11/03/2017 (Exact Date)  BMI 30.86 kg/m2  Body mass index is 30.86 kg/(m^2).    Exam:  Constitutional:  Appearance: Well nourished, well developed alert, in no acute distress  Chest:  Respiratory Effort:  Breathing unlabored  Cardiovascular: No edema  Lymphatic: Lymph Nodes:  No other lymphadenopathy present  Skin:General Inspection:  No rashes present, no lesions present, no areas of discoloration; .  Neurologic/Psychiatric:  Mental Status:  Oriented X3   No Pelvic Exam performed     In-Clinic Test Results:  No results found for this or any previous visit (from the past 24 hour(s)).    ASSESSMENT/PLAN:                                                        ICD-10-CM    1. Menorrhagia with irregular cycle N92.1 US Transvaginal Non OB           Plan: We'll check an ultrasound to see if the patient has a thickened endometrium again.  I did discuss both endometrial ablation in the office and observation.  We will decide on a course of action after her ultrasound exam.    He Hooker MD  [facility]  "

## 2017-11-06 NOTE — MR AVS SNAPSHOT
After Visit Summary   11/6/2017    Yesi Vaughan    MRN: [id number]           Patient Information     Date Of Birth          1970        Visit Information        Provider Department      11/6/2017 3:00 PM He Hooker MD [facility]        Today's Diagnoses     Menorrhagia with irregular cycle    -  1       Follow-ups after your visit        Your next 10 appointments already scheduled     Nov 08, 2017  3:30 PM CST   US TRANSVAGINAL NON OB with WEUS2   [facility] ([facility])    [address]  [facility] [address] [phone]           Please bring a list of your medicines (including vitamins, minerals and over-the-counter drugs). Also, tell your doctor about any allergies you may have. Wear comfortable clothes and leave your valuables at home.  You do not need to do anything special to prepare for your exam.  Please call the Imaging Department at your exam site with any questions.            Nov 08, 2017  4:00 PM CST   SHORT with He Hooker MD   [facility] ([facility]    [address]  [facility] [id number] [phone]              Future tests that were ordered for you today     Open Future Orders        Priority Expected Expires Ordered    US Transvaginal Non OB Routine  11/7/2018 11/6/2017            Who to contact     If you have questions or need follow up information about today's clinic visit or your schedule please contact [facility] [place] directly at [phone].  Normal or non-critical lab and imaging results will be communicated to you by MyChart, letter or phone within 4 business days after the clinic has received the results. If you do not hear from us within 7 days, please contact the clinic through MyChart or phone. If you have a critical or abnormal lab result, we will notify you by phone  "as soon as possible.  Submit refill requests through [employer] or call your pharmacy and they will forward the refill request to us. Please allow 3 business days for your refill to be completed.          Additional Information About Your Visit        [employer] Information     [employer] lets you send messages to your doctor, view your test results, renew your prescriptions, schedule appointments and more. To sign up, go to [email or web address] . Click on \"Log in\" on the left side of the screen, which will take you to the Welcome page. Then click on \"Sign up Now\" on the right side of the page.     You will be asked to enter the access code listed below, as well as some personal information. Please follow the directions to create your username and password.     Your access code is: [email or web address]  Expires: 2018  3:14 PM     Your access code will  in 90 days. If you need help or a new code, please call your [place] clinic or [phone].        Care EveryWhere ID     This is your Care EveryWhere ID. This could be used by other organizations to access your [place] medical records  [id number]        Your Vitals Were     Pulse Height Last Period BMI (Body Mass Index)          72 5' 9\" (1.753 m) 2017 (Exact Date) 30.86 kg/m2         Blood Pressure from Last 3 Encounters:   17 122/76   17 122/78   17 116/73    Weight from Last 3 Encounters:   17 209 lb (94.8 kg)   17 204 lb (92.5 kg)   17 209 lb (94.8 kg)               Primary Care Provider Office Phone # Fax #    Lyubov Faith [phone]       [facility] [address]        Equal Access to Services     LAUREN MAGALLANES : Tammy Dalal, wasiddhartha samsonqmoiz, qaybta kaalmafatou burt. Formerly [facility] [phone].    ATENCIÓN: Si habla español, tiene a cooley disposición servicios gratuitos de asistencia lingüística. Llame al " [phone].    We comply with applicable federal civil rights laws and [place] laws. We do not discriminate on the basis of race, color, national origin, age, disability, sex, sexual orientation, or gender identity.            Thank you!     Thank you for choosing [facility]  for your care. Our goal is always to provide you with excellent care. Hearing back from our patients is one way we can continue to improve our services. Please take a few minutes to complete the written survey that you may receive in the mail after your visit with us. Thank you!             Your Updated Medication List - Protect others around you: Learn how to safely use, store and throw away your medicines at www.disposemymeds.org.          This list is accurate as of: 11/6/17 11:59 PM.  Always use your most recent med list.                   Brand Name Dispense Instructions for use Diagnosis    ibuprofen 600 MG tablet    ADVIL/MOTRIN    30 tablet    Take 1 tablet (600 mg) by mouth every 6 hours as needed for pain (mild)    Acute post-operative pain       lisinopril 20 MG tablet    PRINIVIL/ZESTRIL     TAKE 1 TAB BY MOUTH DAILY.        MULTIVITAMIN PO

## 2017-11-09 ENCOUNTER — TELEPHONE (OUTPATIENT)
Dept: NURSING | Facility: CLINIC | Age: 47
End: 2017-11-09

## 2017-11-09 DIAGNOSIS — N93.8 DUB (DYSFUNCTIONAL UTERINE BLEEDING): Primary | ICD-10-CM

## 2017-11-09 NOTE — TELEPHONE ENCOUNTER
Pt was here 11/6/17. Pt was told to call back today if her bleeding had not stopped, it has not stopped. Pt stated Dr. Hooker said he would order her a medication if bleeding continued. No note bout specific medication in visit notes. Routing to Destiny Mina to review and advise.  Please call pt back at [phone] to inform if Rx sent, ok to leave detailed VM. Routing to Destiny Mina to review and advise.

## 2017-11-13 ENCOUNTER — OFFICE VISIT (OUTPATIENT)
Dept: OBGYN | Facility: CLINIC | Age: 47
End: 2017-11-13
Attending: OBSTETRICS & GYNECOLOGY
Payer: COMMERCIAL

## 2017-11-13 ENCOUNTER — RADIANT APPOINTMENT (OUTPATIENT)
Dept: ULTRASOUND IMAGING | Facility: CLINIC | Age: 47
End: 2017-11-13
Attending: OBSTETRICS & GYNECOLOGY
Payer: COMMERCIAL

## 2017-11-13 VITALS
HEIGHT: 69 IN | DIASTOLIC BLOOD PRESSURE: 72 MMHG | BODY MASS INDEX: 30.81 KG/M2 | WEIGHT: 208 LBS | HEART RATE: 70 BPM | SYSTOLIC BLOOD PRESSURE: 128 MMHG

## 2017-11-13 DIAGNOSIS — N88.2 CERVICAL STENOSIS (UTERINE CERVIX): ICD-10-CM

## 2017-11-13 DIAGNOSIS — N84.0 ENDOMETRIAL POLYP: Primary | ICD-10-CM

## 2017-11-13 DIAGNOSIS — N92.1 MENORRHAGIA WITH IRREGULAR CYCLE: ICD-10-CM

## 2017-11-13 DIAGNOSIS — N92.0 MENORRHAGIA WITH REGULAR CYCLE: ICD-10-CM

## 2017-11-13 PROCEDURE — 99214 OFFICE O/P EST MOD 30 MIN: CPT | Performed by: OBSTETRICS & GYNECOLOGY

## 2017-11-13 PROCEDURE — 76830 TRANSVAGINAL US NON-OB: CPT | Performed by: OBSTETRICS & GYNECOLOGY

## 2017-11-13 RX ORDER — MISOPROSTOL 200 UG/1
200 TABLET ORAL ONCE
Qty: 1 TABLET | Refills: 0 | Status: SHIPPED | OUTPATIENT
Start: 2017-11-13 | End: 2017-11-13

## 2017-11-13 NOTE — MR AVS SNAPSHOT
"              After Visit Summary   11/13/2017    Yesi Vaughan    MRN: [id number]           Patient Information     Date Of Birth          1970        Visit Information        Provider Department      11/13/2017 4:15 PM He Hooker MD [facility]        Today's Diagnoses     Endometrial polyp    -  1    Menorrhagia with regular cycle           Follow-ups after your visit        Your next 10 appointments already scheduled     Nov 24, 2017   Procedure with He Hooker MD   [facility] (--)    [address]  [facility] [id number] [phone]              Who to contact     If you have questions or need follow up information about today's clinic visit or your schedule please contact [facility] directly at [phone].  Normal or non-critical lab and imaging results will be communicated to you by [employer], letter or phone within 4 business days after the clinic has received the results. If you do not hear from us within 7 days, please contact the clinic through MyChart or phone. If you have a critical or abnormal lab result, we will notify you by phone as soon as possible.  Submit refill requests through [employer] or call your pharmacy and they will forward the refill request to us. Please allow 3 business days for your refill to be completed.          Additional Information About Your Visit        MyChart Information     [employer] lets you send messages to your doctor, view your test results, renew your prescriptions, schedule appointments and more. To sign up, go to [email or web address] . Click on \"Log in\" on the left side of the screen, which will take you to the Welcome page. Then click on \"Sign up Now\" on the right side of the page.     You will be asked to enter the access code listed below, as well as some personal information. Please follow the directions to create your username and password.     Your " "access code is: [email or web address]  Expires: 2018  3:14 PM     Your access code will  in 90 days. If you need help or a new code, please call your [place] clinic or [phone].        Care EveryWhere ID     This is your Care EveryWhere ID. This could be used by other organizations to access your [place] medical records  [id number]        Your Vitals Were     Pulse Height Last Period BMI (Body Mass Index)          70 5' 9\" (1.753 m) 2017 (Exact Date) 30.72 kg/m2         Blood Pressure from Last 3 Encounters:   17 128/72   17 122/76   17 122/78    Weight from Last 3 Encounters:   17 208 lb (94.3 kg)   17 209 lb (94.8 kg)   17 204 lb (92.5 kg)              Today, you had the following     No orders found for display       Primary Care Provider Office Phone # Fax #    Lyubov Faith [phone]       [facility] [place] [address] [place] [address]        Equal Access to Services     RAHEEM [facility] AH: Hadii aad ku hadasho Soomaali, waaxda luqadaha, qaybta kaalmada adeegyada, fatou herndon hayfatmatan araceli veras. So [facility] [phone].    ATENCIÓN: Si habla español, tiene a cooley disposición servicios gratuitos de asistencia lingüística. [facility] [phone].    We comply with applicable federal civil rights laws and [place] laws. We do not discriminate on the basis of race, color, national origin, age, disability, sex, sexual orientation, or gender identity.            Thank you!     Thank you for choosing [facility]  for your care. Our goal is always to provide you with excellent care. Hearing back from our patients is one way we can continue to improve our services. Please take a few minutes to complete the written survey that you may receive in the mail after your visit with us. Thank you!             Your Updated Medication List - Protect others around you: Learn how to safely use, store and throw away your " medicines at www.disposemymeds.org.          This list is accurate as of: 11/13/17  4:23 PM.  Always use your most recent med list.                   Brand Name Dispense Instructions for use Diagnosis    ibuprofen 600 MG tablet    ADVIL/MOTRIN    30 tablet    Take 1 tablet (600 mg) by mouth every 6 hours as needed for pain (mild)    Acute post-operative pain       lisinopril 20 MG tablet    PRINIVIL/ZESTRIL     TAKE 1 TAB BY MOUTH DAILY.        MULTIVITAMIN PO           norgestrel-ethinyl estradiol 0.3-30 MG-MCG per tablet    LO/OVRAL    28 tablet    Take 1 tablet by mouth daily NEW SIG'  To take 2 tablets daily till bleeding stops than 1 daily    DUB (dysfunctional uterine bleeding)

## 2017-11-13 NOTE — PROGRESS NOTES
SUBJECTIVE:                                                   Yesi Vaughan is a 47 year old female who presents to clinic today for the following health issue(s):  Patient presents with:  Ultrasound: f/u to menorrhagia      HPI:  Patient is seen for follow-up.  She had her ultrasound today.  It appears that she does have another endometrial polyp or intracavitary myoma.  She is taking the birth control pills to control the bleeding.    Patient's last menstrual period was 2017 (exact date)..   Patient is not sexually active, .  Using not sexually active for contraception.    reports that she has never smoked. She has never used smokeless tobacco.      STD testing offered?  Declined    Health maintenance updated:  yes    Today's PHQ-2 Score: No flowsheet data found.  Today's PHQ-9 Score:   PHQ-9 SCORE 2017   Total Score 1     Today's CRISSY-7 Score:   CRISSY-7 SCORE 2017   Total Score 0       Problem list and histories reviewed & adjusted, as indicated.  Additional history: as documented.    There is no problem list on file for this patient.    Past Surgical History:   Procedure Laterality Date     AS ESOPHAGOSCOPY, DIAGNOSTIC       NO HISTORY OF SURGERY       OPERATIVE HYSTEROSCOPY WITH MORCELLATOR N/A 2017    Procedure: OPERATIVE HYSTEROSCOPY WITH MORCELLATOR (MYOSURE);  OPERATIVE HYSTEROSCOPY WITH MYOSURE MORCELLATOR;  Surgeon: He Hooker MD;  Location: [facility]     wisdom teeth        Social History   Substance Use Topics     Smoking status: Never Smoker     Smokeless tobacco: Never Used     Alcohol use No      Problem (# of Occurrences) Relation (Name,Age of Onset)    DIABETES (1) Maternal Grandfather    Hypertension (1) Mother    Lung Cancer (1) Father    OSTEOPOROSIS (1) Father    Skin Cancer (1) Mother            Current Outpatient Prescriptions   Medication Sig     norgestrel-ethinyl estradiol (LO/OVRAL) 0.3-30 MG-MCG per tablet Take 1 tablet by mouth daily NEW SIG'   "To take 2 tablets daily till bleeding stops than 1 daily     lisinopril (PRINIVIL/ZESTRIL) 20 MG tablet TAKE 1 TAB BY MOUTH DAILY.     Multiple Vitamins-Minerals (MULTIVITAMIN PO)      ibuprofen (ADVIL/MOTRIN) 600 MG tablet Take 1 tablet (600 mg) by mouth every 6 hours as needed for pain (mild) (Patient not taking: Reported on 6/8/2017)     No current facility-administered medications for this visit.      No Known Allergies    ROS:  12 point review of systems negative other than symptoms noted below.    OBJECTIVE:     /72  Pulse 70  Ht 5' 9\" (1.753 m)  Wt 208 lb (94.3 kg)  LMP 11/03/2017 (Exact Date)  BMI 30.72 kg/m2  Body mass index is 30.72 kg/(m^2).    Exam:  Constitutional:  Appearance: Well nourished, well developed alert, in no acute distress  Neck:  Lymph Nodes:  No lymphadenopathy present; Thyroid:  Gland size normal, nontender, no nodules or masses present on palpation  Chest:  Respiratory Effort:  Breathing unlabored  Cardiovascular: Heart: Auscultation:  Regular rate, normal rhythm, no murmurs present  Breasts:  Inspection of Breasts:  No lymphadenopathy present; Palpation of Breasts and Axillae:  No masses present on palpation, no breast tenderness Axillary Lymph Nodes:  No lymphadenopathy present  Lymphatic: Lymph Nodes:  No other lymphadenopathy present  Skin:General Inspection:  No rashes present, no lesions present, no areas of discoloration;   Neurologic/Psychiatric:  Mental Status:  Oriented X3   No Pelvic Exam performed     In-Clinic Test Results:  Results for orders placed or performed in visit on 11/13/17 (from the past 24 hour(s))   US Transvaginal Non OB    Narrative    Gynecological Ultrasonography:   Uterus: retroverted/oblique  Size: 8.78 x 6.53 x 6.46cm  Findings: Single anterior (with posterior lie) intramural fibroid noted   and measures 1.9 x 1.5cm. Uterus is otherwise normal   Endometrium: Thickness total 16.01mm  Findings: 2D, Color Doppler, and 3D views suggest probable " endometrial   cavity polyp 1.1cm in size.   Right Ovary: 2.96 x 1.36 x 1.61cm   Left Ovary: 2.90 x 1.87 x 1.74cm  Cul de Sac/Pouch of Сергей: no ff      Impression: Probable endometrial polyp 1.1 cm in size.  Endometrial   thickness 16.01 mm.  1.9 cm anterior left intramural myoma.    He Hooker MD            ASSESSMENT/PLAN:                                                        ICD-10-CM    1. Endometrial polyp N84.0    2. Menorrhagia with regular cycle N92.0            Plan: Patient will be scheduled for hysteroscopy with resection of the endometrial polyp.  She had the procedure done in May and is familiar with the procedure.    He Hooker MD  [facility]    Amount of time needed for the procedure:  1 hour   Expected time off from work:  1 day  Surgeon:  He Hooker MD  Surgical Procedure:  Hysteroscopy with MyoSure  Preop Needed:  Yes with  Myself  Location for surgery to performed:   Surgery Outpatient  Anesthesia:  General   No Known Allergies    DIAGNOSIS:  Menorrhagia.    Endometrial polyp    Special Instructions:

## 2017-11-14 ENCOUNTER — TELEPHONE (OUTPATIENT)
Dept: OBGYN | Facility: CLINIC | Age: 47
End: 2017-11-14

## 2017-11-14 NOTE — TELEPHONE ENCOUNTER
Patient surgery scheduled on 11/24/2017 at 11am Check in 9am  Location for surgery to performed:   Surgery Outpatient  Scheduled by Rosalee 11/13/2017     Information Packet given :Yes: HANDED 11/14/2017    CPT codes given: Yes    83717        Consents signed? N/A  Rep Informed :N/A    PREOP DATE :  11/13/2017  In Epic :Yes    On Spreadsheet :Yes    On Calendar EB  :No    In [place] Calendar JOHANN  :Yes    Assist NA   Assist in Epic NA  Assist Notified as needed :No    Amount of time needed for the procedure:  1 hour                      Expected time off from work:  1 day  Surgeon:  He Hooker MD  Surgical Procedure:  Hysteroscopy with MyoSure  Preop Needed:  Yes with  Myself  Location for surgery to performed:   Surgery Outpatient  Anesthesia:  General   No Known Allergies     DIAGNOSIS:  Menorrhagia.     Endometrial polyp     Special Instructions:

## 2017-11-20 ENCOUNTER — TELEPHONE (OUTPATIENT)
Dept: OBGYN | Facility: CLINIC | Age: 47
End: 2017-11-20

## 2017-11-20 NOTE — TELEPHONE ENCOUNTER
Pt has surgery scheduled 11/24/17, hysteroscopy with resection of the endometrial polyp. Pt was Rx OCP, norgestrel-ethinyl estradiol (CRYSELLE-28) 0.3-30 MG-MCG per tablet, tocontrol her bleeding. Pt is having HA and nausea from the OCP. Pt has not taken since Friday. Pt is not having any problems with bleeding. Wants to know if she can skip OCP until surgery or if she needs different brand. Pt also is having some cold symptoms, runny nose, sore throat off and on and occ dry cough. Pt denies fever or productive cough. Pt is  and frequency has cold symptoms during the winter months. Pt wondering if this will be a problem with surgery. Pt also asked when she should take the cytotec on Thursday. Informed she could take cytotec Thursday evening. Pt needs to call if she develops fever or productive cough. Routing to Dr. Hooker. Should pt continue on same dose of OCP or change medication.

## 2017-11-20 NOTE — TELEPHONE ENCOUNTER
Reason for Call:  Other call back    Detailed comments: Patient wants a call back to discuss Headache effects from the meds she is taking for DUB?    Phone Number Patient can be reached at: Cell number on file:    Telephone Information:   Mobile [phone]       Best Time: today between 11:00 and 11:30 or 12:30 and 1:00.. Teacher!    Can we leave a detailed message on this number? YES    Call taken on 11/20/2017 at 8:32 AM by Lexy Fisher

## 2017-11-20 NOTE — H&P (VIEW-ONLY)
SUBJECTIVE:                                                   Yesi Vaughan is a 47 year old female who presents to clinic today for the following health issue(s):  Patient presents with:  Ultrasound: f/u to menorrhagia      HPI:  Patient is seen for follow-up.  She had her ultrasound today.  It appears that she does have another endometrial polyp or intracavitary myoma.  She is taking the birth control pills to control the bleeding.    Patient's last menstrual period was 2017 (exact date)..   Patient is not sexually active, .  Using not sexually active for contraception.    reports that she has never smoked. She has never used smokeless tobacco.      STD testing offered?  Declined    Health maintenance updated:  yes    Today's PHQ-2 Score: No flowsheet data found.  Today's PHQ-9 Score:   PHQ-9 SCORE 2017   Total Score 1     Today's CRISSY-7 Score:   CRISSY-7 SCORE 2017   Total Score 0       Problem list and histories reviewed & adjusted, as indicated.  Additional history: as documented.    There is no problem list on file for this patient.    Past Surgical History:   Procedure Laterality Date     AS ESOPHAGOSCOPY, DIAGNOSTIC       NO HISTORY OF SURGERY       OPERATIVE HYSTEROSCOPY WITH MORCELLATOR N/A 2017    Procedure: OPERATIVE HYSTEROSCOPY WITH MORCELLATOR (MYOSURE);  OPERATIVE HYSTEROSCOPY WITH MYOSURE MORCELLATOR;  Surgeon: He Hooker MD;  Location: [facility]     wisdom teeth        Social History   Substance Use Topics     Smoking status: Never Smoker     Smokeless tobacco: Never Used     Alcohol use No      Problem (# of Occurrences) Relation (Name,Age of Onset)    DIABETES (1) Maternal Grandfather    Hypertension (1) Mother    Lung Cancer (1) Father    OSTEOPOROSIS (1) Father    Skin Cancer (1) Mother            Current Outpatient Prescriptions   Medication Sig     norgestrel-ethinyl estradiol (LO/OVRAL) 0.3-30 MG-MCG per tablet Take 1 tablet by mouth daily NEW SIG'   "To take 2 tablets daily till bleeding stops than 1 daily     lisinopril (PRINIVIL/ZESTRIL) 20 MG tablet TAKE 1 TAB BY MOUTH DAILY.     Multiple Vitamins-Minerals (MULTIVITAMIN PO)      ibuprofen (ADVIL/MOTRIN) 600 MG tablet Take 1 tablet (600 mg) by mouth every 6 hours as needed for pain (mild) (Patient not taking: Reported on 6/8/2017)     No current facility-administered medications for this visit.      No Known Allergies    ROS:  12 point review of systems negative other than symptoms noted below.    OBJECTIVE:     /72  Pulse 70  Ht 5' 9\" (1.753 m)  Wt 208 lb (94.3 kg)  LMP 11/03/2017 (Exact Date)  BMI 30.72 kg/m2  Body mass index is 30.72 kg/(m^2).    Exam:  Constitutional:  Appearance: Well nourished, well developed alert, in no acute distress  Neck:  Lymph Nodes:  No lymphadenopathy present; Thyroid:  Gland size normal, nontender, no nodules or masses present on palpation  Chest:  Respiratory Effort:  Breathing unlabored  Cardiovascular: Heart: Auscultation:  Regular rate, normal rhythm, no murmurs present  Breasts:  Inspection of Breasts:  No lymphadenopathy present; Palpation of Breasts and Axillae:  No masses present on palpation, no breast tenderness Axillary Lymph Nodes:  No lymphadenopathy present  Lymphatic: Lymph Nodes:  No other lymphadenopathy present  Skin:General Inspection:  No rashes present, no lesions present, no areas of discoloration;   Neurologic/Psychiatric:  Mental Status:  Oriented X3   No Pelvic Exam performed     In-Clinic Test Results:  Results for orders placed or performed in visit on 11/13/17 (from the past 24 hour(s))   US Transvaginal Non OB    Narrative    Gynecological Ultrasonography:   Uterus: retroverted/oblique  Size: 8.78 x 6.53 x 6.46cm  Findings: Single anterior (with posterior lie) intramural fibroid noted   and measures 1.9 x 1.5cm. Uterus is otherwise normal   Endometrium: Thickness total 16.01mm  Findings: 2D, Color Doppler, and 3D views suggest probable " endometrial   cavity polyp 1.1cm in size.   Right Ovary: 2.96 x 1.36 x 1.61cm   Left Ovary: 2.90 x 1.87 x 1.74cm  Cul de Sac/Pouch of Сергей: no ff      Impression: Probable endometrial polyp 1.1 cm in size.  Endometrial   thickness 16.01 mm.  1.9 cm anterior left intramural myoma.    He Hooker MD            ASSESSMENT/PLAN:                                                        ICD-10-CM    1. Endometrial polyp N84.0    2. Menorrhagia with regular cycle N92.0            Plan: Patient will be scheduled for hysteroscopy with resection of the endometrial polyp.  She had the procedure done in May and is familiar with the procedure.    He Hooker MD  [facility]    Amount of time needed for the procedure:  1 hour   Expected time off from work:  1 day  Surgeon:  He Hooker MD  Surgical Procedure:  Hysteroscopy with MyoSure  Preop Needed:  Yes with  Myself  Location for surgery to performed:   Surgery Outpatient  Anesthesia:  General   No Known Allergies    DIAGNOSIS:  Menorrhagia.    Endometrial polyp    Special Instructions:

## 2017-11-22 ENCOUNTER — TELEPHONE (OUTPATIENT)
Dept: NURSING | Facility: CLINIC | Age: 47
End: 2017-11-22

## 2017-11-22 NOTE — TELEPHONE ENCOUNTER
Spoke with Dr Nolan. He said that the pt can still plan on having her surgery on Friday. If the bleeding should become heavy she should restart the Crystelle bcp.  Called the pt and reviewed Dr Hooker recommendation. Pt verbalized understanding.

## 2017-11-22 NOTE — TELEPHONE ENCOUNTER
"Pt calling in with concerns. She is scheduled to have surgery on Friday 11/24/17. She started bleeding again- \"heavy but not saturating a pad every hour. Passed a few dime sized clots.\" Pt said that she quit taking the Crystelle last Friday due to having nausea. She is concerned that she will not be able to have her surgery on Friday due to the bleeding- Should she restart the BCP again? Please ask Dr Hooker what should I do. Can be reached at [phone].  Will Route to Dr. Hooker to advise.  "

## 2017-11-24 ENCOUNTER — ANESTHESIA EVENT (OUTPATIENT)
Dept: SURGERY | Facility: CLINIC | Age: 47
End: 2017-11-24
Payer: COMMERCIAL

## 2017-11-24 ENCOUNTER — ANESTHESIA (OUTPATIENT)
Dept: SURGERY | Facility: CLINIC | Age: 47
End: 2017-11-24
Payer: COMMERCIAL

## 2017-11-24 ENCOUNTER — SURGERY (OUTPATIENT)
Age: 47
End: 2017-11-24
Payer: COMMERCIAL

## 2017-11-24 ENCOUNTER — HOSPITAL ENCOUNTER (OUTPATIENT)
Facility: CLINIC | Age: 47
Discharge: HOME | End: 2017-11-24
Attending: OBSTETRICS & GYNECOLOGY | Admitting: OBSTETRICS & GYNECOLOGY
Payer: COMMERCIAL

## 2017-11-24 VITALS
OXYGEN SATURATION: 98 % | TEMPERATURE: 98.4 F | RESPIRATION RATE: 16 BRPM | DIASTOLIC BLOOD PRESSURE: 79 MMHG | HEIGHT: 69 IN | SYSTOLIC BLOOD PRESSURE: 116 MMHG | BODY MASS INDEX: 30.24 KG/M2 | WEIGHT: 204.2 LBS

## 2017-11-24 LAB — B-HCG SERPL-ACNC: <1 IU/L (ref 0–5)

## 2017-11-24 PROCEDURE — 27210794 ZZH OR GENERAL SUPPLY STERILE: Performed by: OBSTETRICS & GYNECOLOGY

## 2017-11-24 PROCEDURE — 25000128 H RX IP 250 OP 636: Performed by: NURSE ANESTHETIST, CERTIFIED REGISTERED

## 2017-11-24 PROCEDURE — 71000013 ZZH RECOVERY PHASE 1 LEVEL 1 EA ADDTL HR: Performed by: OBSTETRICS & GYNECOLOGY

## 2017-11-24 PROCEDURE — 36000058 ZZH SURGERY LEVEL 3 EA 15 ADDTL MIN: Performed by: OBSTETRICS & GYNECOLOGY

## 2017-11-24 PROCEDURE — 88305 TISSUE EXAM BY PATHOLOGIST: CPT | Mod: 26 | Performed by: OBSTETRICS & GYNECOLOGY

## 2017-11-24 PROCEDURE — 25000125 ZZHC RX 250: Performed by: NURSE ANESTHETIST, CERTIFIED REGISTERED

## 2017-11-24 PROCEDURE — 40000170 ZZH STATISTIC PRE-PROCEDURE ASSESSMENT II: Performed by: OBSTETRICS & GYNECOLOGY

## 2017-11-24 PROCEDURE — 84702 CHORIONIC GONADOTROPIN TEST: CPT | Performed by: OBSTETRICS & GYNECOLOGY

## 2017-11-24 PROCEDURE — 37000009 ZZH ANESTHESIA TECHNICAL FEE, EACH ADDTL 15 MIN: Performed by: OBSTETRICS & GYNECOLOGY

## 2017-11-24 PROCEDURE — 25000128 H RX IP 250 OP 636: Performed by: OBSTETRICS & GYNECOLOGY

## 2017-11-24 PROCEDURE — 37000008 ZZH ANESTHESIA TECHNICAL FEE, 1ST 30 MIN: Performed by: OBSTETRICS & GYNECOLOGY

## 2017-11-24 PROCEDURE — 88305 TISSUE EXAM BY PATHOLOGIST: CPT | Performed by: OBSTETRICS & GYNECOLOGY

## 2017-11-24 PROCEDURE — 58558 HYSTEROSCOPY BIOPSY: CPT | Performed by: OBSTETRICS & GYNECOLOGY

## 2017-11-24 PROCEDURE — 36415 COLL VENOUS BLD VENIPUNCTURE: CPT | Performed by: OBSTETRICS & GYNECOLOGY

## 2017-11-24 PROCEDURE — 71000027 ZZH RECOVERY PHASE 2 EACH 15 MINS: Performed by: OBSTETRICS & GYNECOLOGY

## 2017-11-24 PROCEDURE — 25800025 ZZH RX 258: Performed by: OBSTETRICS & GYNECOLOGY

## 2017-11-24 PROCEDURE — 36000056 ZZH SURGERY LEVEL 3 1ST 30 MIN: Performed by: OBSTETRICS & GYNECOLOGY

## 2017-11-24 PROCEDURE — 71000012 ZZH RECOVERY PHASE 1 LEVEL 1 FIRST HR: Performed by: OBSTETRICS & GYNECOLOGY

## 2017-11-24 RX ORDER — NALOXONE HYDROCHLORIDE 0.4 MG/ML
.1-.4 INJECTION, SOLUTION INTRAMUSCULAR; INTRAVENOUS; SUBCUTANEOUS
Status: DISCONTINUED | OUTPATIENT
Start: 2017-11-24 | End: 2017-11-24 | Stop reason: HOSPADM

## 2017-11-24 RX ORDER — CEFAZOLIN SODIUM 2 G/100ML
2 INJECTION, SOLUTION INTRAVENOUS
Status: COMPLETED | OUTPATIENT
Start: 2017-11-24 | End: 2017-11-24

## 2017-11-24 RX ORDER — SODIUM CHLORIDE, SODIUM LACTATE, POTASSIUM CHLORIDE, CALCIUM CHLORIDE 600; 310; 30; 20 MG/100ML; MG/100ML; MG/100ML; MG/100ML
INJECTION, SOLUTION INTRAVENOUS CONTINUOUS PRN
Status: DISCONTINUED | OUTPATIENT
Start: 2017-11-24 | End: 2017-11-24

## 2017-11-24 RX ORDER — SODIUM CHLORIDE, SODIUM LACTATE, POTASSIUM CHLORIDE, CALCIUM CHLORIDE 600; 310; 30; 20 MG/100ML; MG/100ML; MG/100ML; MG/100ML
INJECTION, SOLUTION INTRAVENOUS CONTINUOUS
Status: DISCONTINUED | OUTPATIENT
Start: 2017-11-24 | End: 2017-11-24 | Stop reason: HOSPADM

## 2017-11-24 RX ORDER — ONDANSETRON 4 MG/1
4 TABLET, ORALLY DISINTEGRATING ORAL EVERY 30 MIN PRN
Status: DISCONTINUED | OUTPATIENT
Start: 2017-11-24 | End: 2017-11-24 | Stop reason: HOSPADM

## 2017-11-24 RX ORDER — DEXAMETHASONE SODIUM PHOSPHATE 4 MG/ML
INJECTION, SOLUTION INTRA-ARTICULAR; INTRALESIONAL; INTRAMUSCULAR; INTRAVENOUS; SOFT TISSUE PRN
Status: DISCONTINUED | OUTPATIENT
Start: 2017-11-24 | End: 2017-11-24

## 2017-11-24 RX ORDER — FENTANYL CITRATE 50 UG/ML
25-50 INJECTION, SOLUTION INTRAMUSCULAR; INTRAVENOUS
Status: DISCONTINUED | OUTPATIENT
Start: 2017-11-24 | End: 2017-11-24 | Stop reason: HOSPADM

## 2017-11-24 RX ORDER — MEPERIDINE HYDROCHLORIDE 25 MG/ML
12.5 INJECTION INTRAMUSCULAR; INTRAVENOUS; SUBCUTANEOUS
Status: DISCONTINUED | OUTPATIENT
Start: 2017-11-24 | End: 2017-11-24 | Stop reason: HOSPADM

## 2017-11-24 RX ORDER — HYDROMORPHONE HYDROCHLORIDE 1 MG/ML
.3-.5 INJECTION, SOLUTION INTRAMUSCULAR; INTRAVENOUS; SUBCUTANEOUS EVERY 10 MIN PRN
Status: DISCONTINUED | OUTPATIENT
Start: 2017-11-24 | End: 2017-11-24 | Stop reason: HOSPADM

## 2017-11-24 RX ORDER — HYDROCODONE BITARTRATE AND ACETAMINOPHEN 5; 325 MG/1; MG/1
1 TABLET ORAL
Status: DISCONTINUED | OUTPATIENT
Start: 2017-11-24 | End: 2017-11-24 | Stop reason: HOSPADM

## 2017-11-24 RX ORDER — ONDANSETRON 2 MG/ML
4 INJECTION INTRAMUSCULAR; INTRAVENOUS EVERY 30 MIN PRN
Status: DISCONTINUED | OUTPATIENT
Start: 2017-11-24 | End: 2017-11-24 | Stop reason: HOSPADM

## 2017-11-24 RX ORDER — IBUPROFEN 600 MG/1
600 TABLET, FILM COATED ORAL
Status: DISCONTINUED | OUTPATIENT
Start: 2017-11-24 | End: 2017-11-24

## 2017-11-24 RX ORDER — LIDOCAINE HYDROCHLORIDE 20 MG/ML
INJECTION, SOLUTION INFILTRATION; PERINEURAL PRN
Status: DISCONTINUED | OUTPATIENT
Start: 2017-11-24 | End: 2017-11-24

## 2017-11-24 RX ORDER — HYDROCODONE BITARTRATE AND ACETAMINOPHEN 5; 325 MG/1; MG/1
1 TABLET ORAL
Status: DISCONTINUED | OUTPATIENT
Start: 2017-11-24 | End: 2017-11-24

## 2017-11-24 RX ORDER — PROPOFOL 10 MG/ML
INJECTION, EMULSION INTRAVENOUS PRN
Status: DISCONTINUED | OUTPATIENT
Start: 2017-11-24 | End: 2017-11-24

## 2017-11-24 RX ORDER — FENTANYL CITRATE 50 UG/ML
25-50 INJECTION, SOLUTION INTRAMUSCULAR; INTRAVENOUS EVERY 5 MIN PRN
Status: DISCONTINUED | OUTPATIENT
Start: 2017-11-24 | End: 2017-11-24 | Stop reason: HOSPADM

## 2017-11-24 RX ORDER — PROPOFOL 10 MG/ML
INJECTION, EMULSION INTRAVENOUS CONTINUOUS PRN
Status: DISCONTINUED | OUTPATIENT
Start: 2017-11-24 | End: 2017-11-24

## 2017-11-24 RX ORDER — IBUPROFEN 600 MG/1
600 TABLET, FILM COATED ORAL
Status: DISCONTINUED | OUTPATIENT
Start: 2017-11-24 | End: 2017-11-24 | Stop reason: HOSPADM

## 2017-11-24 RX ORDER — FENTANYL CITRATE 50 UG/ML
INJECTION, SOLUTION INTRAMUSCULAR; INTRAVENOUS PRN
Status: DISCONTINUED | OUTPATIENT
Start: 2017-11-24 | End: 2017-11-24

## 2017-11-24 RX ORDER — ONDANSETRON 2 MG/ML
INJECTION INTRAMUSCULAR; INTRAVENOUS PRN
Status: DISCONTINUED | OUTPATIENT
Start: 2017-11-24 | End: 2017-11-24

## 2017-11-24 RX ADMIN — PROPOFOL 200 MG: 10 INJECTION, EMULSION INTRAVENOUS at 11:50

## 2017-11-24 RX ADMIN — CEFAZOLIN SODIUM 2 G: 2 INJECTION, SOLUTION INTRAVENOUS at 11:53

## 2017-11-24 RX ADMIN — DEXMEDETOMIDINE HYDROCHLORIDE 4 MCG: 100 INJECTION, SOLUTION INTRAVENOUS at 12:10

## 2017-11-24 RX ADMIN — DEXAMETHASONE SODIUM PHOSPHATE 4 MG: 4 INJECTION, SOLUTION INTRA-ARTICULAR; INTRALESIONAL; INTRAMUSCULAR; INTRAVENOUS; SOFT TISSUE at 11:55

## 2017-11-24 RX ADMIN — SODIUM CHLORIDE, POTASSIUM CHLORIDE, SODIUM LACTATE AND CALCIUM CHLORIDE: 600; 310; 30; 20 INJECTION, SOLUTION INTRAVENOUS at 11:48

## 2017-11-24 RX ADMIN — PHENYLEPHRINE HYDROCHLORIDE 100 MCG: 10 INJECTION INTRAVENOUS at 12:17

## 2017-11-24 RX ADMIN — PHENYLEPHRINE HYDROCHLORIDE 50 MCG: 10 INJECTION INTRAVENOUS at 12:06

## 2017-11-24 RX ADMIN — ONDANSETRON 4 MG: 2 INJECTION INTRAMUSCULAR; INTRAVENOUS at 11:55

## 2017-11-24 RX ADMIN — MIDAZOLAM HYDROCHLORIDE 2 MG: 1 INJECTION, SOLUTION INTRAMUSCULAR; INTRAVENOUS at 11:49

## 2017-11-24 RX ADMIN — LIDOCAINE HYDROCHLORIDE 100 MG: 20 INJECTION, SOLUTION INFILTRATION; PERINEURAL at 11:50

## 2017-11-24 RX ADMIN — FENTANYL CITRATE 50 MCG: 50 INJECTION, SOLUTION INTRAMUSCULAR; INTRAVENOUS at 11:49

## 2017-11-24 RX ADMIN — PHENYLEPHRINE HYDROCHLORIDE 50 MCG: 10 INJECTION INTRAVENOUS at 11:59

## 2017-11-24 RX ADMIN — SODIUM CHLORIDE 2370 ML: 900 IRRIGANT IRRIGATION at 12:20

## 2017-11-24 RX ADMIN — PROPOFOL 200 MCG/KG/MIN: 10 INJECTION, EMULSION INTRAVENOUS at 11:50

## 2017-11-24 NOTE — OP NOTE
PREOPERATIVE DIAGNOSES:    1.  Menorrhagia.   2.  Endometrial polyps.      POSTOPERATIVE DIAGNOSES:   1.  Menorrhagia.   2.  Endometrial polyps.   3.  Pathology pending.      PROCEDURES:   1.  Hysteroscopy.   2.  MyoSure resection of endometrial polyps and endometrium.      SURGEON:  Shayan Hooker MD       ANESTHESIA:  General.      ESTIMATED BLOOD LOSS:  1 cc.      INDICATIONS:  The patient Amarilis Lloyd is a 47-year-old white female with a history of menorrhagia.  She had an episode of menorrhagia in 2017 and had multiple large anterior cavitary polyp removed.  Recent ultrasound shows a thickened endometrium consistent with polyps.      DESCRIPTION OF PROCEDURE:  The patient was brought to the operating room on 2017 where under general anesthesia she was placed in the lithotomy position and was prepped and draped in the usual sterile fashion.  A speculum was placed in the vagina, and the anterior lip of the cervix was grasped with a tenaculum.  The uterus sounded to 8 cm.  The endocervix was dilated through a #7 Hegar.      Using the MyoSure hysteroscope for exploration the cavity was found to contain a right effect endometrium which was polypoid in nature.  A thorough resection of the endometrium was performed with the MyoSure device.  At the end of the case the cavity was smooth and regular.        Instruments were removed.  The patient was taken to the Recovery Room in good condition.         SHAYAN HOOKER MD             D: 2017 12:29   T: 2017 13:17   MT: [id number]      Name:     AMARILIS LLOYD   MRN:      [id number]        Account:        [id number]   :      1970           Procedure Date: 2017      Document: [id number]       cc: Lyubov Faith MD

## 2017-11-24 NOTE — IP AVS SNAPSHOT
[facility] Same Day Surgery    [address]    [place] [address]    Phone:  [phone]    Fax:  [phone]                                       After Visit Summary   11/24/2017    Yesi Vaughan    MRN: [id number]           After Visit Summary Signature Page     I have received my discharge instructions, and my questions have been answered. I have discussed any challenges I see with this plan with the nurse or doctor.    ..........................................................................................................................................  Patient/Patient Representative Signature      ..........................................................................................................................................  Patient Representative Print Name and Relationship to Patient    ..................................................               ................................................  Date                                            Time    ..........................................................................................................................................  Reviewed by Signature/Title    ...................................................              ..............................................  Date                                                            Time

## 2017-11-24 NOTE — IP AVS SNAPSHOT
MRN:[id number]                      After Visit Summary   11/24/2017    Yesi Vaughan    MRN: [id number]           Thank you!     Thank you for choosing [facility] for your care. Our goal is always to provide you with excellent care. Hearing back from our patients is one way we can continue to improve our services. Please take a few minutes to complete the written survey that you may receive in the mail after you visit with us. Thank you!        Patient Information     Date Of Birth          1970        About your hospital stay     You were admitted on:  November 24, 2017 You last received care in the:  [facility] Same Day Surgery    You were discharged on:  November 24, 2017       Who to Call     For medical emergencies, please call 911.  For non-urgent questions about your medical care, please call your primary care provider or clinic, [phone]  For questions related to your surgery, please call your surgery clinic        Attending Provider     Provider Specialty    He Hooker MD OB/Gyn       Primary Care Provider Office Phone # Fax #    Lyubov Faith [phone]      After Care Instructions     Discharge Instructions       Patient to arrange follow up appointment in 2-3  weeks                  Further instructions from your care team       Same Day Surgery Discharge Instructions for  Sedation and General Anesthesia       It's not unusual to feel dizzy, light-headed or faint for up to 24 hours after surgery or while taking pain medication.  If you have these symptoms: sit for a few minutes before standing and have someone assist you when you get up to walk or use the bathroom.      You should rest and relax for the next 24 hours. We recommend you make arrangements to have an adult stay with you for at least 24 hours after your discharge.  Avoid hazardous and strenuous activity.      DO NOT DRIVE any vehicle or operate mechanical equipment for 24  hours following the end of your surgery.  Even though you may feel normal, your reactions may be affected by the medication you have received.      Do not drink alcoholic beverages for 24 hours following surgery.       Slowly progress to your regular diet as you feel able. It's not unusual to feel nauseated and/or vomit after receiving anesthesia.  If you develop these symptoms, drink clear liquids (apple juice, ginger ale, broth, 7-up, etc. ) until you feel better.  If your nausea and vomiting persists for 24 hours, please notify your surgeon.        All narcotic pain medications, along with inactivity and anesthesia, can cause constipation. Drinking plenty of liquids and increasing fiber intake will help.      For any questions of a medical nature, call your surgeon.      Do not make important decisions for 24 hours.      If you had general anesthesia, you may have a sore throat for a couple of days related to the breathing tube used during surgery.  You may use Cepacol lozenges to help with this discomfort.  If it worsens or if you develop a fever, contact your surgeon.       If you feel your pain is not well managed with the pain medications prescribed by your surgeon, please contact your surgeon's office to let them know so they can address your concerns.     [facility]  Discharge Instructions  Following D & C / Hysteroscopy    Activity  You may resume normal activities including lifting as needed.  It is permissible to climb stairs. You may drive a car after 24 hours as long as you are not taking narcotic pain pills.   Baths or showers are perfectly acceptable.     Vaginal Discharge  You may have some vaginal bleeding or discharge for about a week after procedure.  You may use tampons or pads.    Temperature  If you develop temperature elevations to over 101  Fahrenheit, your physician should be called immediately.    Diet  [place] or light diet is advisable the day of surgery.  If nausea  "persists, continue this diet.  If severe, call.    Follow-up  Make an appointment in 1-2 weeks if instructed to at: [phone]                                  **If you have questions or concerns about your procedure,  call Dr. Hooker at [phone]**    While you were at the hospital today you received Toradol, an antiinflammatory medication similar to Ibuprofen.  You should not take other antiinflammatory medication, such as Ibuprofen, Motrin, Advil, Aleve, Naprosyn, etc, until 6:20pm.     Pending Results     Date and Time Order Name Status Description    2017 1221 Surgical pathology exam In process             Admission Information     Date & Time Provider Department Dept. Phone    2017 He Hooker MD [facility] Same Day Surgery [phone]      Your Vitals Were     Blood Pressure Temperature Respirations Height Weight Last Period     98.4  F (36.9  C) 12 1.753 m (5' 9\") 92.6 kg (204 lb 3.2 oz) 2017 (Exact Date)    Pulse Oximetry BMI (Body Mass Index)                99% 30.16 kg/m2          [employer] Information     [employer] lets you send messages to your doctor, view your test results, renew your prescriptions, schedule appointments and more. To sign up, go to [email or web address] . Click on \"Log in\" on the left side of the screen, which will take you to the Welcome page. Then click on \"Sign up Now\" on the right side of the page.     You will be asked to enter the access code listed below, as well as some personal information. Please follow the directions to create your username and password.     Your access code is: [email or web address]  Expires: 2018  3:14 PM     Your access code will  in 90 days. If you need help or a new code, please call your [facility] clinic or [phone].        Care EveryWhere ID     This is your Care EveryWhere ID. This could be used by other organizations to access your [facility] medical records  [id number]        Equal Access to " Services     [facility]: Hadii lucille Dalal, waaxda luqadaha, qaybta kaalmanimco combs, fatou toledo . So [facility] [phone].    ATENCIÓN: Si brittnyla magy, tiene a cooley disposición servicios gratuitos de asistencia lingüística. Llame al [phone].    We comply with applicable federal civil rights laws and [place] laws. We do not discriminate on the basis of race, color, national origin, age, disability, sex, sexual orientation, or gender identity.               Review of your medicines      CONTINUE these medicines which have NOT CHANGED        Dose / Directions    CRYSELLE-28 0.3-30 MG-MCG per tablet   Generic drug:  norgestrel-ethinyl estradiol        Dose:  1 tablet   Take 1 tablet by mouth daily   Refills:  0       ibuprofen 600 MG tablet   Commonly known as:  ADVIL/MOTRIN   Used for:  Acute post-operative pain        Dose:  600 mg   Take 1 tablet (600 mg) by mouth every 6 hours as needed for pain (mild)   Quantity:  30 tablet   Refills:  0       LISINOPRIL PO        Dose:  20 mg   Take 20 mg by mouth daily   Refills:  0       TAB-A-IBIS PO        Dose:  1 tablet   Take 1 tablet by mouth daily   Refills:  0                Protect others around you: Learn how to safely use, store and throw away your medicines at www.disposemymeds.org.             Medication List: This is a list of all your medications and when to take them. Check marks below indicate your daily home schedule. Keep this list as a reference.      Medications           Morning Afternoon Evening Bedtime As Needed    CRYSELLE-28 0.3-30 MG-MCG per tablet   Take 1 tablet by mouth daily   Generic drug:  norgestrel-ethinyl estradiol                                ibuprofen 600 MG tablet   Commonly known as:  ADVIL/MOTRIN   Take 1 tablet (600 mg) by mouth every 6 hours as needed for pain (mild)                                LISINOPRIL PO   Take 20 mg by mouth daily                                TAB-A-IBIS PO    Take 1 tablet by mouth daily

## 2017-11-24 NOTE — ANESTHESIA PREPROCEDURE EVALUATION
Anesthesia Evaluation     . Pt has had prior anesthetic. Type: General    No history of anesthetic complications          ROS/MED HX    ENT/Pulmonary:     (+)sleep apnea, , . .    Neurologic:       Cardiovascular:  - neg cardiovascular ROS   (+) hypertension----. : . . . :. .       METS/Exercise Tolerance:     Hematologic:         Musculoskeletal:         GI/Hepatic:     (+) GERD Asymptomatic on medication,       Renal/Genitourinary:         Endo:     (+) thyroid problem  Thyroid disease - Other cyst, Obesity, .      Psychiatric:         Infectious Disease:         Malignancy:         Other:                     Physical Exam  Normal systems: cardiovascular, pulmonary and dental    Airway   Mallampati: II  TM distance: >3 FB  Neck ROM: full    Dental     Cardiovascular   Rhythm and rate: regular and normal      Pulmonary    breath sounds clear to auscultation                    Anesthesia Plan      History & Physical Review  History and physical reviewed and following examination; no interval change.    ASA Status:  2 .    NPO Status:  > 8 hours    Plan for General and LMA with Propofol induction. Maintenance will be TIVA.    PONV prophylaxis:  Ondansetron (or other 5HT-3) and Dexamethasone or Solumedrol       Postoperative Care  Postoperative pain management:  IV analgesics and Oral pain medications.      Consents  Anesthetic plan, risks, benefits and alternatives discussed with:  Patient..                          .

## 2017-11-24 NOTE — BRIEF OP NOTE
[facility] Gynecology Brief Operative Note    Pre-operative diagnosis: Endometrial polyps   Post-operative diagnosis: Same   Procedure: Hysteroscopy with MyoSure resection of endometrial polyps   Surgeon: He Hooker MD   Assistant(s): None   Anesthesia: General Endotracheal Anesthesia   Estimated blood loss: less than 50ml   Total IV fluids: (See anesthesia record)   Blood transfusion: No transfusion was given during surgery   Total urine output: (See anesthesia record)   Drains: None   Specimens: Endometrium with polypoid tissue   Findings: Uterus sounded to 8 cm.  Endometrial cavity was shaggy with polypoid changes.  Endometrial tissue was resected.   Complications: None   Condition: Stable   Comments: See dictated operative report for full details

## 2017-11-24 NOTE — ANESTHESIA CARE TRANSFER NOTE
Patient: Yesi Vaughan    Procedure(s):  HYSTEROSCOPY WITH MYOSURE  - Wound Class: II-Clean Contaminated    Diagnosis: POLYP  Diagnosis Additional Information: No value filed.    Anesthesia Type:   General, LMA     Note:  Airway :LMA  Patient transferred to:PACU  Comments: To pacu bed 10 with LMA. Pt responding to commands upon arrival.  LMA removed. Report given to RN assuming care of pt      Vitals: (Last set prior to Anesthesia Care Transfer)    CRNA VITALS  11/24/2017 1201 - 11/24/2017 1236      11/24/2017             Pulse: 80    SpO2: 97 %    Resp Rate (observed): 13                Electronically Signed By: CASTILLO Awan CRNA  November 24, 2017  12:36 PM

## 2017-11-24 NOTE — DISCHARGE INSTRUCTIONS
Same Day Surgery Discharge Instructions for  Sedation and General Anesthesia       It's not unusual to feel dizzy, light-headed or faint for up to 24 hours after surgery or while taking pain medication.  If you have these symptoms: sit for a few minutes before standing and have someone assist you when you get up to walk or use the bathroom.      You should rest and relax for the next 24 hours. We recommend you make arrangements to have an adult stay with you for at least 24 hours after your discharge.  Avoid hazardous and strenuous activity.      DO NOT DRIVE any vehicle or operate mechanical equipment for 24 hours following the end of your surgery.  Even though you may feel normal, your reactions may be affected by the medication you have received.      Do not drink alcoholic beverages for 24 hours following surgery.       Slowly progress to your regular diet as you feel able. It's not unusual to feel nauseated and/or vomit after receiving anesthesia.  If you develop these symptoms, drink clear liquids (apple juice, ginger ale, broth, 7-up, etc. ) until you feel better.  If your nausea and vomiting persists for 24 hours, please notify your surgeon.        All narcotic pain medications, along with inactivity and anesthesia, can cause constipation. Drinking plenty of liquids and increasing fiber intake will help.      For any questions of a medical nature, call your surgeon.      Do not make important decisions for 24 hours.      If you had general anesthesia, you may have a sore throat for a couple of days related to the breathing tube used during surgery.  You may use Cepacol lozenges to help with this discomfort.  If it worsens or if you develop a fever, contact your surgeon.       If you feel your pain is not well managed with the pain medications prescribed by your surgeon, please contact your surgeon's office to let them know so they can address your concerns.     [facility]  Discharge  Instructions  Following D & C / Hysteroscopy    Activity  You may resume normal activities including lifting as needed.  It is permissible to climb stairs. You may drive a car after 24 hours as long as you are not taking narcotic pain pills.   Baths or showers are perfectly acceptable.     Vaginal Discharge  You may have some vaginal bleeding or discharge for about a week after procedure.  You may use tampons or pads.    Temperature  If you develop temperature elevations to over 101  Fahrenheit, your physician should be called immediately.    Diet  Eastern or light diet is advisable the day of surgery.  If nausea persists, continue this diet.  If severe, call.    Follow-up  Make an appointment in 1-2 weeks if instructed to at: [phone]                                  **If you have questions or concerns about your procedure,  call Dr. Hooker at [phone]**    While you were at the hospital today you received Toradol, an antiinflammatory medication similar to Ibuprofen.  You should not take other antiinflammatory medication, such as Ibuprofen, Motrin, Advil, Aleve, Naprosyn, etc, until 6:20pm.

## 2017-11-24 NOTE — ANESTHESIA POSTPROCEDURE EVALUATION
Patient: Yesi Vaughan    Procedure(s):  HYSTEROSCOPY WITH MYOSURE  - Wound Class: II-Clean Contaminated    Diagnosis:POLYP  Diagnosis Additional Information: No value filed.    Anesthesia Type:  General, LMA    Note:  Anesthesia Post Evaluation    Patient location during evaluation: PACU  Patient participation: Able to fully participate in evaluation  Level of consciousness: awake  Pain management: adequate  Airway patency: patent  Cardiovascular status: acceptable  Respiratory status: acceptable  Hydration status: acceptable  PONV: none     Anesthetic complications: None          Last vitals:  Vitals:    11/24/17 0925 11/24/17 1237   BP: 117/80    Resp: 16    Temp: 36.3  C (97.3  F) 36.8  C (98.2  F)   SpO2: 97%          Electronically Signed By: Kyree Parker MD  November 24, 2017  12:43 PM

## 2017-11-27 LAB — COPATH REPORT: NORMAL

## 2017-12-13 ENCOUNTER — OFFICE VISIT (OUTPATIENT)
Dept: OBGYN | Facility: CLINIC | Age: 47
End: 2017-12-13
Payer: COMMERCIAL

## 2017-12-13 VITALS
SYSTOLIC BLOOD PRESSURE: 116 MMHG | DIASTOLIC BLOOD PRESSURE: 68 MMHG | WEIGHT: 208 LBS | HEIGHT: 69 IN | HEART RATE: 68 BPM | BODY MASS INDEX: 30.81 KG/M2

## 2017-12-13 DIAGNOSIS — Z48.816 AFTERCARE FOLLOWING SURGERY OF THE GENITOURINARY SYSTEM: ICD-10-CM

## 2017-12-13 DIAGNOSIS — N85.01 ENDOMETRIAL HYPERPLASIA, SIMPLE: Primary | ICD-10-CM

## 2017-12-13 LAB — HGB BLD-MCNC: 11.1 G/DL (ref 11.7–15.7)

## 2017-12-13 PROCEDURE — 36415 COLL VENOUS BLD VENIPUNCTURE: CPT | Performed by: OBSTETRICS & GYNECOLOGY

## 2017-12-13 PROCEDURE — 99213 OFFICE O/P EST LOW 20 MIN: CPT | Performed by: OBSTETRICS & GYNECOLOGY

## 2017-12-13 PROCEDURE — 85018 HEMOGLOBIN: CPT | Performed by: OBSTETRICS & GYNECOLOGY

## 2017-12-13 NOTE — PROGRESS NOTES
SUBJECTIVE:                                                   Yesi Vaughan is a 47 year old female who presents to clinic today for the following health issue(s):  Patient presents with:  Surgical Followup: 17: Hysteroscopy, MyoSure resection of endometrial polyps and endometrium        HPI:  Patient seen for follow-up after recent hysteroscopy with resection of endometrium.  Pathology showed fragments of polyps with simple hyperplasia.    Patient's last menstrual period was 2017..   Patient is not sexually active, .  Using not sexually active for contraception.    reports that she has never smoked. She has never used smokeless tobacco.      STD testing offered?  Declined    Health maintenance updated:  yes    Today's PHQ-2 Score:   PHQ-2 (  [employer]) 2017   Q1: Little interest or pleasure in doing things 0   Q2: Feeling down, depressed or hopeless 0   PHQ-2 Score 0     Today's PHQ-9 Score:   PHQ-9 SCORE 2017   Total Score 1     Today's CRISSY-7 Score:   CRISSY-7 SCORE 2017   Total Score 0       Problem list and histories reviewed & adjusted, as indicated.  Additional history: as documented.    There is no problem list on file for this patient.    Past Surgical History:   Procedure Laterality Date     AS ESOPHAGOSCOPY, DIAGNOSTIC       OPERATIVE HYSTEROSCOPY WITH MORCELLATOR N/A 2017    Procedure: OPERATIVE HYSTEROSCOPY WITH MORCELLATOR (MYOSURE);  OPERATIVE HYSTEROSCOPY WITH MYOSURE MORCELLATOR;  Surgeon: He Hooker MD;  Location: [facility]     OPERATIVE HYSTEROSCOPY WITH MORCELLATOR N/A 2017    Procedure: OPERATIVE HYSTEROSCOPY WITH MORCELLATOR (MYOSURE);  HYSTEROSCOPY WITH MYOSURE ;  Surgeon: He Hooker MD;  Location: [facility]     wisdom teeth        Social History   Substance Use Topics     Smoking status: Never Smoker     Smokeless tobacco: Never Used     Alcohol use No      Problem (# of Occurrences) Relation (Name,Age of Onset)    DIABETES  "(1) Maternal Grandfather    Hypertension (1) Mother    Lung Cancer (1) Father    OSTEOPOROSIS (1) Father    Skin Cancer (1) Mother            Current Outpatient Prescriptions   Medication Sig     LISINOPRIL PO Take 20 mg by mouth daily     Multiple Vitamin (TAB-A-IBIS PO) Take 1 tablet by mouth daily     No current facility-administered medications for this visit.      No Known Allergies        OBJECTIVE:     /68  Pulse 68  Ht 5' 9\" (1.753 m)  Wt 208 lb (94.3 kg)  LMP 11/03/2017  BMI 30.72 kg/m2  Body mass index is 30.72 kg/(m^2).    Exam:  Constitutional:  Appearance: Well nourished, well developed alert, in no acute distress  Chest:  Respiratory Effort:  Breathing unlabored  Cardiovascular: no edema  Skin:General Inspection:  No rashes present, no lesions present, no areas of discoloration;   Neurologic/Psychiatric:  Mental Status:  Oriented X3      In-Clinic Test Results:  Results for orders placed or performed in visit on 12/13/17 (from the past 24 hour(s))   Hemoglobin   Result Value Ref Range    Hemoglobin 11.1 (L) 11.7 - 15.7 g/dL       ASSESSMENT/PLAN:                                                        ICD-10-CM    1. Endometrial hyperplasia, simple N85.01 US Transvaginal Non OB    No atypia   2. Aftercare following surgery of the genitourinary system Z48.816 Hemoglobin           Plan: I discussed the simple endometrial hyperplasia with the patient.  The hysteroscopy with resection was very thorough.  At this point I recommended she return to clinic in 3-4 months for an ultrasound exam and repeat endometrial biopsy.  If the patient continues to show a propensity for developing polyps or thickened endometrium that a hysterectomy would be indicated.    He Hooker MD  [facility] [place]  "

## 2017-12-13 NOTE — MR AVS SNAPSHOT
After Visit Summary   12/13/2017    Yesi Vaughan    MRN: [id number]           Patient Information     Date Of Birth          1970        Visit Information        Provider Department      12/13/2017 3:30 PM He Hooker MD [facility] [place]        Today's Diagnoses     Endometrial hyperplasia, simple    -  1    Aftercare following surgery of the genitourinary system           Follow-ups after your visit        Your next 10 appointments already scheduled     Mar 13, 2018  3:15 PM CDT   US PELVIC COMPLETE W TRANSVAGINAL with WEUS2   [facility] [place] ([facility])    [address] [place] [address]   [phone]           Please bring a list of your medicines (including vitamins, minerals and over-the-counter drugs). Also, tell your doctor about any allergies you may have. Wear comfortable clothes and leave your valuables at home.  Adults: Drink six 8-ounce glasses of fluid one hour before your exam. Do NOT empty your bladder.  If you need to empty your bladder before your exam, try to release only a little bit of urine. Then, drink another 8oz glass of fluid.  Children: Children who are potty trained should drink at least 4 cups (32 oz) of liquid 45 minutes to one hour prior to the exam. The child s bladder must be full in order to achieve a diagnostic exam. If your child is very uncomfortable or has an urgent need to pee, please notify a technologist; they will try to find out how much longer the wait may be and provide instructions to help relieve the pressure. Occasionally it is medically necessary to insert a urinary catheter to fill the bladder.  Please call the Imaging Department at your exam site with any questions.            Mar 13, 2018  3:45 PM CDT   Office Visit with He Hooker MD   [facility] ([facility])    [address] [place] [address]  "[address]  [place] [address] [phone]           Bring a current list of meds and any records pertaining to this visit. For Physicals, please bring immunization records and any forms needing to be filled out. Please arrive 10 minutes early to complete paperwork.              Future tests that were ordered for you today     Open Standing Orders        Priority Remaining Interval Expires Ordered    US Transvaginal Non OB Routine 2/2 12/14/2018 12/13/2017            Who to contact     If you have questions or need follow up information about today's clinic visit or your schedule please contact [facility] [place] directly at [phone].  Normal or non-critical lab and imaging results will be communicated to you by MyChart, letter or phone within 4 business days after the clinic has received the results. If you do not hear from us within 7 days, please contact the clinic through [employer]art or phone. If you have a critical or abnormal lab result, we will notify you by phone as soon as possible.  Submit refill requests through CommProve or call your pharmacy and they will forward the refill request to us. Please allow 3 business days for your refill to be completed.          Additional Information About Your Visit        [employer] Information     CommProve gives you secure access to your electronic health record. If you see a primary care provider, you can also send messages to your care team and make appointments. If you have questions, please call your primary care clinic.  If you do not have a primary care provider, please call [phone] and they will assist you.        Care EveryWhere ID     This is your Care EveryWhere ID. This could be used by other organizations to access your [facility] medical records  [id number]        Your Vitals Were     Pulse Height Last Period BMI (Body Mass Index)          68 5' 9\" (1.753 m) 11/03/2017 30.72 kg/m2         Blood Pressure from Last 3 Encounters:   12/13/17 116/68 "   11/24/17 116/79   11/13/17 128/72    Weight from Last 3 Encounters:   12/13/17 208 lb (94.3 kg)   11/24/17 204 lb 3.2 oz (92.6 kg)   11/13/17 208 lb (94.3 kg)              We Performed the Following     Hemoglobin        Primary Care Provider Office Phone # Fax #    Lyubov Faith [phone]       [facility] [address] [address]        Equal Access to Services     RAHEEM MAGALLANES : Hadii aad ku hadasho Soomaali, waaxda luqadaha, qaybta kaalmada adeegyada, waxay jaclynin hayfatmatan araceli toledo . So [facility] [phone].    ATENCIÓN: Si habla español, tiene a cooley disposición servicios gratuitos de asistencia lingüística. Ll[facility] [phone].    We comply with applicable federal civil rights laws and [place] laws. We do not discriminate on the basis of race, color, national origin, age, disability, sex, sexual orientation, or gender identity.            Thank you!     Thank you for choosing [facility] [place]  for your care. Our goal is always to provide you with excellent care. Hearing back from our patients is one way we can continue to improve our services. Please take a few minutes to complete the written survey that you may receive in the mail after your visit with us. Thank you!             Your Updated Medication List - Protect others around you: Learn how to safely use, store and throw away your medicines at www.disposemymeds.org.          This list is accurate as of: 12/13/17  4:19 PM.  Always use your most recent med list.                   Brand Name Dispense Instructions for use Diagnosis    LISINOPRIL PO      Take 20 mg by mouth daily        TAB-A-IBIS PO      Take 1 tablet by mouth daily

## 2018-02-16 ENCOUNTER — TRANSFERRED RECORDS (OUTPATIENT)
Dept: HEALTH INFORMATION MANAGEMENT | Facility: CLINIC | Age: 48
End: 2018-02-16

## 2018-03-21 ENCOUNTER — RADIANT APPOINTMENT (OUTPATIENT)
Dept: ULTRASOUND IMAGING | Facility: CLINIC | Age: 48
End: 2018-03-21
Attending: OBSTETRICS & GYNECOLOGY
Payer: COMMERCIAL

## 2018-03-21 ENCOUNTER — OFFICE VISIT (OUTPATIENT)
Dept: OBGYN | Facility: CLINIC | Age: 48
End: 2018-03-21
Attending: OBSTETRICS & GYNECOLOGY
Payer: COMMERCIAL

## 2018-03-21 VITALS
BODY MASS INDEX: 30.81 KG/M2 | HEART RATE: 84 BPM | SYSTOLIC BLOOD PRESSURE: 114 MMHG | DIASTOLIC BLOOD PRESSURE: 78 MMHG | HEIGHT: 69 IN | WEIGHT: 208 LBS

## 2018-03-21 DIAGNOSIS — N85.01 ENDOMETRIAL HYPERPLASIA, SIMPLE: ICD-10-CM

## 2018-03-21 DIAGNOSIS — N85.01 ENDOMETRIAL HYPERPLASIA, SIMPLE: Primary | ICD-10-CM

## 2018-03-21 PROCEDURE — 58100 BIOPSY OF UTERUS LINING: CPT | Performed by: OBSTETRICS & GYNECOLOGY

## 2018-03-21 PROCEDURE — 76830 TRANSVAGINAL US NON-OB: CPT | Performed by: OBSTETRICS & GYNECOLOGY

## 2018-03-21 PROCEDURE — 88305 TISSUE EXAM BY PATHOLOGIST: CPT | Performed by: OBSTETRICS & GYNECOLOGY

## 2018-03-21 NOTE — MR AVS SNAPSHOT
"              After Visit Summary   3/21/2018    Yesi Vaughan    MRN: [id number]           Patient Information     Date Of Birth          1970        Visit Information        Provider Department      3/21/2018 3:45 PM He Hooker MD [facility] [place]        Today's Diagnoses     Endometrial hyperplasia, simple    -  1       Follow-ups after your visit        Who to contact     If you have questions or need follow up information about today's clinic visit or your schedule please contact [facility] directly at [phone].  Normal or non-critical lab and imaging results will be communicated to you by PlayDohart, letter or phone within 4 business days after the clinic has received the results. If you do not hear from us within 7 days, please contact the clinic through Propellert or phone. If you have a critical or abnormal lab result, we will notify you by phone as soon as possible.  Submit refill requests through Flutter or call your pharmacy and they will forward the refill request to us. Please allow 3 business days for your refill to be completed.          Additional Information About Your Visit        MyChart Information     Flutter gives you secure access to your electronic health record. If you see a primary care provider, you can also send messages to your care team and make appointments. If you have questions, please call your primary care clinic.  If you do not have a primary care provider, please call [phone] and they will assist you.        Care EveryWhere ID     This is your Care EveryWhere ID. This could be used by other organizations to access your [facility] medical records  [id number]        Your Vitals Were     Pulse Height BMI (Body Mass Index)             84 5' 9\" (1.753 m) 30.72 kg/m2          Blood Pressure from Last 3 Encounters:   03/21/18 114/78   12/13/17 116/68   11/24/17 116/79    Weight from Last 3 Encounters:   03/21/18 208 lb " (94.3 kg)   12/13/17 208 lb (94.3 kg)   11/24/17 204 lb 3.2 oz (92.6 kg)              We Performed the Following     ENDOMETRIAL BIOPSY W/O CERVICAL DILATION     Surgical pathology exam        Primary Care Provider Office Phone # Fax #    Lyubov Faith [phone]       [facility] [address] [address]        Equal Access to Services     LAUREN MAGALLANES : Hadii aad ku hadasho Soomaali, waaxda luqadaha, qaybta kaalmada adeegyada, waxay idiin hayaan adeeg kharash la'fatmatan . So [facility] [phone].    ATENCIÓN: Si habla esphuong, tiene a cooley disposición servicios gratuitos de asistencia lingüística. Ll[facility] [phone].    We comply with applicable federal civil rights laws and [place] laws. We do not discriminate on the basis of race, color, national origin, age, disability, sex, sexual orientation, or gender identity.            Thank you!     Thank you for choosing [facility] [place]  for your care. Our goal is always to provide you with excellent care. Hearing back from our patients is one way we can continue to improve our services. Please take a few minutes to complete the written survey that you may receive in the mail after your visit with us. Thank you!             Your Updated Medication List - Protect others around you: Learn how to safely use, store and throw away your medicines at www.disposemymeds.org.          This list is accurate as of 3/21/18  4:26 PM.  Always use your most recent med list.                   Brand Name Dispense Instructions for use Diagnosis    LISINOPRIL PO      Take 20 mg by mouth daily        TAB-A-IBIS PO      Take 1 tablet by mouth daily

## 2018-03-23 LAB — COPATH REPORT: NORMAL

## 2018-06-07 ENCOUNTER — OFFICE VISIT (OUTPATIENT)
Dept: OBGYN | Facility: CLINIC | Age: 48
End: 2018-06-07
Payer: COMMERCIAL

## 2018-06-07 VITALS — DIASTOLIC BLOOD PRESSURE: 72 MMHG | SYSTOLIC BLOOD PRESSURE: 100 MMHG

## 2018-06-07 DIAGNOSIS — N89.8 VAGINAL DISCHARGE: ICD-10-CM

## 2018-06-07 DIAGNOSIS — R30.0 DYSURIA: Primary | ICD-10-CM

## 2018-06-07 LAB
ALBUMIN UR-MCNC: NEGATIVE MG/DL
APPEARANCE UR: ABNORMAL
BACTERIA #/AREA URNS HPF: ABNORMAL /HPF
BILIRUB UR QL STRIP: NEGATIVE
COLOR UR AUTO: YELLOW
GLUCOSE UR STRIP-MCNC: NEGATIVE MG/DL
HGB UR QL STRIP: ABNORMAL
KETONES UR STRIP-MCNC: NEGATIVE MG/DL
LEUKOCYTE ESTERASE UR QL STRIP: ABNORMAL
NITRATE UR QL: NEGATIVE
PH UR STRIP: 6 PH (ref 5–7)
RBC #/AREA URNS AUTO: ABNORMAL /HPF
SOURCE: ABNORMAL
SP GR UR STRIP: <=1.005 (ref 1–1.03)
SPECIMEN SOURCE: ABNORMAL
UROBILINOGEN UR STRIP-ACNC: 0.2 EU/DL (ref 0.2–1)
WBC #/AREA URNS AUTO: ABNORMAL /HPF
WET PREP SPEC: ABNORMAL

## 2018-06-07 PROCEDURE — 87086 URINE CULTURE/COLONY COUNT: CPT | Performed by: NURSE PRACTITIONER

## 2018-06-07 PROCEDURE — 87088 URINE BACTERIA CULTURE: CPT | Performed by: NURSE PRACTITIONER

## 2018-06-07 PROCEDURE — 87210 SMEAR WET MOUNT SALINE/INK: CPT | Performed by: NURSE PRACTITIONER

## 2018-06-07 PROCEDURE — 81001 URINALYSIS AUTO W/SCOPE: CPT | Performed by: NURSE PRACTITIONER

## 2018-06-07 PROCEDURE — 87186 SC STD MICRODIL/AGAR DIL: CPT | Performed by: NURSE PRACTITIONER

## 2018-06-07 PROCEDURE — 99213 OFFICE O/P EST LOW 20 MIN: CPT | Performed by: NURSE PRACTITIONER

## 2018-06-07 RX ORDER — NITROFURANTOIN 25; 75 MG/1; MG/1
100 CAPSULE ORAL 2 TIMES DAILY
Qty: 10 CAPSULE | Refills: 0 | Status: SHIPPED | OUTPATIENT
Start: 2018-06-07 | End: 2018-09-18

## 2018-06-07 RX ORDER — METRONIDAZOLE 7.5 MG/G
1 GEL VAGINAL AT BEDTIME
Qty: 70 G | Refills: 0 | Status: SHIPPED | OUTPATIENT
Start: 2018-06-07 | End: 2018-06-12

## 2018-06-07 NOTE — MR AVS SNAPSHOT
After Visit Summary   6/7/2018    Yesi Vaughan    MRN: [id number]           Patient Information     Date Of Birth          1970        Visit Information        Provider Department      6/7/2018 1:00 PM Julia Mina APRN CNP [facility] [place]        Today's Diagnoses     Dysuria    -  1    Vaginal discharge           Follow-ups after your visit        Follow-up notes from your care team     Return if symptoms worsen or fail to improve.      Who to contact     If you have questions or need follow up information about today's clinic visit or your schedule please contact [facility] directly at [phone].  Normal or non-critical lab and imaging results will be communicated to you by Red Dot Paymenthart, letter or phone within 4 business days after the clinic has received the results. If you do not hear from us within 7 days, please contact the clinic through Red Dot Paymenthart or phone. If you have a critical or abnormal lab result, we will notify you by phone as soon as possible.  Submit refill requests through Impact Driven or call your pharmacy and they will forward the refill request to us. Please allow 3 business days for your refill to be completed.          Additional Information About Your Visit        MyChart Information     Impact Driven gives you secure access to your electronic health record. If you see a primary care provider, you can also send messages to your care team and make appointments. If you have questions, please call your primary care clinic.  If you do not have a primary care provider, please call [phone] and they will assist you.        Care EveryWhere ID     This is your Care EveryWhere ID. This could be used by other organizations to access your [facility] medical records  [id number]         Blood Pressure from Last 3 Encounters:   06/07/18 100/72   03/21/18 114/78   12/13/17 116/68    Weight from Last 3 Encounters:   03/21/18 208 lb (94.3 kg)    12/13/17 208 lb (94.3 kg)   11/24/17 204 lb 3.2 oz (92.6 kg)              We Performed the Following     UA with Microscopic     Urine Culture Aerobic Bacterial     Wet prep          Today's Medication Changes          These changes are accurate as of 6/7/18  2:38 PM.  If you have any questions, ask your nurse or doctor.               Start taking these medicines.        Dose/Directions    metroNIDAZOLE 0.75 % vaginal gel   Commonly known as:  METROGEL   Used for:  Vaginal discharge   Started by:  Julia Mina APRN CNP        Dose:  1 applicator   Place 1 applicator (5 g) vaginally At Bedtime for 5 days   Quantity:  70 g   Refills:  0       nitroFURantoin (macrocrystal-monohydrate) 100 MG capsule   Commonly known as:  MACROBID   Used for:  Dysuria   Started by:  Julia Mina APRN CNP        Dose:  100 mg   Take 1 capsule (100 mg) by mouth 2 times daily   Quantity:  10 capsule   Refills:  0            Where to get your medicines      These medications were sent to [address] [address]     Phone:  [phone]     nitroFURantoin (macrocrystal-monohydrate) 100 MG capsule         These medications were sent to [facility] [address], [facility] [address]  [address], [address] [address]     Phone:  [phone]     metroNIDAZOLE 0.75 % vaginal gel                Primary Care Provider Office Phone # Fax Romelia Faith [phone]       [facility] [place] [address] [place] [address]        Equal Access to Services     RAHEEM MAGALLANES : Hadbonnie Dalal, nigel ozuna, qafatou toledo. So [facility] [phone].    ATENCIÓN: Si habla español, tiene a cooley disposición servicios gratuitos de asistencia lingüística. Llame al [phone].    We comply with applicable federal civil rights laws  and [place] laws. We do not discriminate on the basis of race, color, national origin, age, disability, sex, sexual orientation, or gender identity.            Thank you!     Thank you for choosing [facility] [place]  for your care. Our goal is always to provide you with excellent care. Hearing back from our patients is one way we can continue to improve our services. Please take a few minutes to complete the written survey that you may receive in the mail after your visit with us. Thank you!             Your Updated Medication List - Protect others around you: Learn how to safely use, store and throw away your medicines at www.disposemymeds.org.          This list is accurate as of 6/7/18  2:38 PM.  Always use your most recent med list.                   Brand Name Dispense Instructions for use Diagnosis    LISINOPRIL PO      Take 20 mg by mouth daily        metroNIDAZOLE 0.75 % vaginal gel    METROGEL    70 g    Place 1 applicator (5 g) vaginally At Bedtime for 5 days    Vaginal discharge       nitroFURantoin (macrocrystal-monohydrate) 100 MG capsule    MACROBID    10 capsule    Take 1 capsule (100 mg) by mouth 2 times daily    Dysuria       TAB-A-IBIS PO      Take 1 tablet by mouth daily

## 2018-06-07 NOTE — PROGRESS NOTES
SUBJECTIVE:                                                   Yesi Vaughan is a 47 year old female who presents to clinic today for the following health issue(s):  Patient presents with:  UTI      HPI:patient c/o urgency, frequency and some burning with urination.  Also having some vaginal irritation.  Just finished a course of keflex for strep throat.  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      No LMP recorded. Patient is not currently having periods (Reason: Irregular Periods)..   Patient is sexually active, .  Using none for contraception.    reports that she has never smoked. She has never used smokeless tobacco.    STD testing offered?  Declined    Health maintenance updated:  yes    Today's PHQ-2 Score:   PHQ-2 (  [employer]) 2017   Q1: Little interest or pleasure in doing things 0   Q2: Feeling down, depressed or hopeless 0   PHQ-2 Score 0     Today's PHQ-9 Score:   PHQ-9 SCORE 2017   Total Score 1     Today's CRISSY-7 Score:   CRISSY-7 SCORE 2017   Total Score 0       Problem list and histories reviewed & adjusted, as indicated.  Additional history: as documented.    There is no problem list on file for this patient.    Past Surgical History:   Procedure Laterality Date     AS ESOPHAGOSCOPY, DIAGNOSTIC       OPERATIVE HYSTEROSCOPY WITH MORCELLATOR N/A 2017    Procedure: OPERATIVE HYSTEROSCOPY WITH MORCELLATOR (MYOSURE);  OPERATIVE HYSTEROSCOPY WITH MYOSURE MORCELLATOR;  Surgeon: He Hooker MD;  Location: [facility]     OPERATIVE HYSTEROSCOPY WITH MORCELLATOR N/A 2017    Procedure: OPERATIVE HYSTEROSCOPY WITH MORCELLATOR (MYOSURE);  HYSTEROSCOPY WITH MYOSURE ;  Surgeon: He Hooker MD;  Location: [facility]     wisdom teeth        Social History   Substance Use Topics     Smoking status: Never Smoker     Smokeless tobacco: Never Used     Alcohol use No      Problem (# of  Occurrences) Relation (Name,Age of Onset)    DIABETES (1) Maternal Grandfather    Hypertension (1) Mother    Lung Cancer (1) Father    OSTEOPOROSIS (1) Father    Skin Cancer (1) Mother            Current Outpatient Prescriptions   Medication Sig     LISINOPRIL PO Take 20 mg by mouth daily     metroNIDAZOLE (METROGEL) 0.75 % vaginal gel Place 1 applicator (5 g) vaginally At Bedtime for 5 days     Multiple Vitamin (TAB-A-IBIS PO) Take 1 tablet by mouth daily     nitroFURantoin, macrocrystal-monohydrate, (MACROBID) 100 MG capsule Take 1 capsule (100 mg) by mouth 2 times daily     No current facility-administered medications for this visit.      No Known Allergies    ROS:  12 point review of systems negative other than symptoms noted below.  Genitourinary: Painful Urination and Urgency    OBJECTIVE:     /72  There is no height or weight on file to calculate BMI.    Exam:  Constitutional:  Appearance: Well nourished, well developed alert, in no acute distress   External genitalia slightly red and irritated.  Vagina  Moderate amount of white discharge.  Cervix normal very end of cycle.  Wet mount was done.    In-Clinic Test Results:  Results for orders placed or performed in visit on 06/07/18 (from the past 24 hour(s))   UA with Microscopic   Result Value Ref Range    Color Urine Yellow     Appearance Urine Slightly Cloudy     Glucose Urine Negative NEG^Negative mg/dL    Bilirubin Urine Negative NEG^Negative    Ketones Urine Negative NEG^Negative mg/dL    Specific Gravity Urine <=1.005 1.003 - 1.035    pH Urine 6.0 5.0 - 7.0 pH    Protein Albumin Urine Negative NEG^Negative mg/dL    Urobilinogen Urine 0.2 0.2 - 1.0 EU/dL    Nitrite Urine Negative NEG^Negative    Blood Urine 3+ (A) NEG^Negative    Leukocyte Esterase Urine 1+ (A) NEG^Negative    Source Midstream Urine     WBC Urine  (A) OTO5^0 - 5 /HPF    RBC Urine O - 2 OTO2^O - 2 /HPF    Bacteria Urine Few (A) NEG^Negative /HPF   Wet prep   Result Value Ref  Range    Specimen Description Vagina     Wet Prep No Trichomonas seen     Wet Prep Clue cells seen (A)     Wet Prep No yeast seen        ASSESSMENT/PLAN:                                                        ICD-10-CM    1. Dysuria R30.0 UA with Microscopic     Urine Culture Aerobic Bacterial     nitroFURantoin, macrocrystal-monohydrate, (MACROBID) 100 MG capsule   2. Vaginal discharge N89.8 Wet prep     metroNIDAZOLE (METROGEL) 0.75 % vaginal gel       There are no Patient Instructions on file for this visit.    Will send a UC  Patient to start metrogel and macrobid  Will notify patient with lab results when available.  Return prn .    CASTILLO [facility]

## 2018-06-08 LAB
BACTERIA SPEC CULT: ABNORMAL
Lab: ABNORMAL
SPECIMEN SOURCE: ABNORMAL

## 2018-09-18 ENCOUNTER — OFFICE VISIT (OUTPATIENT)
Dept: OBGYN | Facility: CLINIC | Age: 48
End: 2018-09-18
Payer: COMMERCIAL

## 2018-09-18 VITALS
HEART RATE: 68 BPM | TEMPERATURE: 98.2 F | WEIGHT: 211 LBS | SYSTOLIC BLOOD PRESSURE: 112 MMHG | HEIGHT: 69 IN | DIASTOLIC BLOOD PRESSURE: 66 MMHG | BODY MASS INDEX: 31.25 KG/M2

## 2018-09-18 DIAGNOSIS — R30.0 DYSURIA: ICD-10-CM

## 2018-09-18 DIAGNOSIS — R39.9 UTI SYMPTOMS: Primary | ICD-10-CM

## 2018-09-18 DIAGNOSIS — R82.90 NONSPECIFIC FINDING ON EXAMINATION OF URINE: ICD-10-CM

## 2018-09-18 DIAGNOSIS — N89.8 VAGINAL DISCHARGE: ICD-10-CM

## 2018-09-18 LAB
BACTERIA #/AREA URNS HPF: ABNORMAL /HPF
NON-SQ EPI CELLS #/AREA URNS LPF: ABNORMAL /LPF
RBC #/AREA URNS AUTO: ABNORMAL /HPF
SPECIMEN SOURCE: NORMAL
WBC #/AREA URNS AUTO: ABNORMAL /HPF
WET PREP SPEC: NORMAL

## 2018-09-18 PROCEDURE — 87210 SMEAR WET MOUNT SALINE/INK: CPT | Performed by: NURSE PRACTITIONER

## 2018-09-18 PROCEDURE — 87186 SC STD MICRODIL/AGAR DIL: CPT | Performed by: NURSE PRACTITIONER

## 2018-09-18 PROCEDURE — 87086 URINE CULTURE/COLONY COUNT: CPT | Performed by: NURSE PRACTITIONER

## 2018-09-18 PROCEDURE — 99213 OFFICE O/P EST LOW 20 MIN: CPT | Performed by: NURSE PRACTITIONER

## 2018-09-18 PROCEDURE — 81015 MICROSCOPIC EXAM OF URINE: CPT | Performed by: NURSE PRACTITIONER

## 2018-09-18 PROCEDURE — 87088 URINE BACTERIA CULTURE: CPT | Performed by: NURSE PRACTITIONER

## 2018-09-18 RX ORDER — NITROFURANTOIN 25; 75 MG/1; MG/1
100 CAPSULE ORAL 2 TIMES DAILY
Qty: 14 CAPSULE | Refills: 0 | Status: SHIPPED | OUTPATIENT
Start: 2018-09-18 | End: 2018-10-17

## 2018-09-18 NOTE — PROGRESS NOTES
SUBJECTIVE:                                                   Yesi Vaughan is a 47 year old female who presents to clinic today for the following health issue(s):  Patient presents with:  UTI: woke at 3am with urgency and pain.      HPI:patient c/o burning, urgency and frequent urination only going small amounts.  Also having some vaginal discharge.  She woke up at 3am today with symptons, so did take a AZO.      Patient's last menstrual period was 2018..   Patient is not sexually active, .  Using not sexually active for contraception.    reports that she has never smoked. She has never used smokeless tobacco.    STD testing offered?  Declined    Health maintenance updated:  yes    Today's PHQ-2 Score:   PHQ-2 (  [employer]) 2017   Q1: Little interest or pleasure in doing things 0   Q2: Feeling down, depressed or hopeless 0   PHQ-2 Score 0     Today's PHQ-9 Score:   PHQ-9 SCORE 2017   Total Score 1     Today's CRISSY-7 Score:   CRISSY-7 SCORE 2017   Total Score 0       Problem list and histories reviewed & adjusted, as indicated.  Additional history: as documented.    There is no problem list on file for this patient.    Past Surgical History:   Procedure Laterality Date     AS ESOPHAGOSCOPY, DIAGNOSTIC       OPERATIVE HYSTEROSCOPY WITH MORCELLATOR N/A 2017    Procedure: OPERATIVE HYSTEROSCOPY WITH MORCELLATOR (MYOSURE);  OPERATIVE HYSTEROSCOPY WITH MYOSURE MORCELLATOR;  Surgeon: He Hooker MD;  Location: [facility]     OPERATIVE HYSTEROSCOPY WITH MORCELLATOR N/A 2017    Procedure: OPERATIVE HYSTEROSCOPY WITH MORCELLATOR (MYOSURE);  HYSTEROSCOPY WITH MYOSURE ;  Surgeon: He Hooker MD;  Location: [facility]     wisdom teeth        Social History   Substance Use Topics     Smoking status: Never Smoker     Smokeless tobacco: Never Used     Alcohol use No      Problem (# of Occurrences) Relation (Name,Age of Onset)    Diabetes (1) Maternal Grandfather     "Hypertension (1) Mother    Lung Cancer (1) Father    Osteoporosis (1) Father    Skin Cancer (1) Mother            Current Outpatient Prescriptions   Medication Sig     LISINOPRIL PO Take 20 mg by mouth daily     nitroFURantoin, macrocrystal-monohydrate, (MACROBID) 100 MG capsule Take 1 capsule (100 mg) by mouth 2 times daily     Phenazopyridine HCl (AZO TABS PO)      No current facility-administered medications for this visit.      No Known Allergies    ROS:  12 point review of systems negative other than symptoms noted below.  Genitourinary: Painful Urination and Urgency    OBJECTIVE:     /66  Pulse 68  Temp 98.2  F (36.8  C)  Ht 5' 9\" (1.753 m)  Wt 211 lb (95.7 kg)  LMP 08/13/2018  Breastfeeding? Yes  BMI 31.16 kg/m2  Body mass index is 31.16 kg/(m^2).    Exam:  Constitutional:  Appearance: Well nourished, well developed alert, in no acute distress   External genitalia appear normal.  Vagina small amount of yellow discharge.  Cervix no discharge or tenderness noted.  Wet mount was done.  Discussed more fluids, wiping front to back    In-Clinic Test Results:  Results for orders placed or performed in visit on 09/18/18 (from the past 24 hour(s))   Urine Microscopic   Result Value Ref Range    WBC Urine 10-25 (A) OTO5^0 - 5 /HPF    RBC Urine O - 2 OTO2^O - 2 /HPF    Squamous Epithelial /LPF Urine Few FEW^Few /LPF    Bacteria Urine Many (A) NEG^Negative /HPF   Wet prep   Result Value Ref Range    Specimen Description Vagina     Wet Prep No Trichomonas seen     Wet Prep No clue cells seen     Wet Prep No yeast seen        ASSESSMENT/PLAN:                                                        ICD-10-CM    1. UTI symptoms R39.9 Urine Microscopic     Urine Culture Aerobic Bacterial     CANCELED: UA with Microscopic   2. Nonspecific finding on examination of urine R82.90 Urine Culture Aerobic Bacterial   3. Vaginal discharge N89.8 Wet prep   4. Dysuria R30.0 nitroFURantoin, macrocrystal-monohydrate, " (MACROBID) 100 MG capsule       There are no Patient Instructions on file for this visit.    Will send UC.  Patient to start macrobid.  Return prn.    CASTILLO Paul [facility]

## 2018-09-18 NOTE — MR AVS SNAPSHOT
"              After Visit Summary   9/18/2018    Yesi Vaughan    MRN: [id number]           Patient Information     Date Of Birth          1970        Visit Information        Provider Department      9/18/2018 11:00 AM Julia Mina APRN CNP [facility]        Today's Diagnoses     UTI symptoms    -  1    Nonspecific finding on examination of urine        Vaginal discharge        Dysuria           Follow-ups after your visit        Who to contact     If you have questions or need follow up information about today's clinic visit or your schedule please contact [facility] directly at [phone].  Normal or non-critical lab and imaging results will be communicated to you by [employer], letter or phone within 4 business days after the clinic has received the results. If you do not hear from us within 7 days, please contact the clinic through [employer] or phone. If you have a critical or abnormal lab result, we will notify you by phone as soon as possible.  Submit refill requests through [employer] or call your pharmacy and they will forward the refill request to us. Please allow 3 business days for your refill to be completed.          Additional Information About Your Visit        MyChart Information     [employer] gives you secure access to your electronic health record. If you see a primary care provider, you can also send messages to your care team and make appointments. If you have questions, please call your primary care clinic.  If you do not have a primary care provider, please call [phone] and they will assist you.        Care EveryWhere ID     This is your Care EveryWhere ID. This could be used by other organizations to access your [place] medical records  [id number]        Your Vitals Were     Pulse Temperature Height Last Period Breastfeeding? BMI (Body Mass Index)    68 98.2  F (36.8  C) 5' 9\" (1.753 m) 08/13/2018 Yes 31.16 kg/m2       Blood " Pressure from Last 3 Encounters:   09/18/18 112/66   06/07/18 100/72   03/21/18 114/78    Weight from Last 3 Encounters:   09/18/18 211 lb (95.7 kg)   03/21/18 208 lb (94.3 kg)   12/13/17 208 lb (94.3 kg)              We Performed the Following     Urine Culture Aerobic Bacterial     Urine Microscopic     Wet prep          Today's Medication Changes          These changes are accurate as of 9/18/18 11:36 AM.  If you have any questions, ask your nurse or doctor.               Start taking these medicines.        Dose/Directions    nitroFURantoin (macrocrystal-monohydrate) 100 MG capsule   Commonly known as:  MACROBID   Used for:  Dysuria   Started by:  Julia Mina APRN CNP        Dose:  100 mg   Take 1 capsule (100 mg) by mouth 2 times daily   Quantity:  14 capsule   Refills:  0            Where to get your medicines      These medications were sent to [place] [address] [place] [address] [address]     Phone:  [phone]     nitroFURantoin (macrocrystal-monohydrate) 100 MG capsule                Primary Care Provider Office Phone # Fax #    Lyubov Faith [phone]       [facility] [place] [address] [place] [address]        Equal Access to Services     LAUREN MAGALLANES AH: Hadii lucille ku hadasho Soomaali, waaxda luqadaha, qaybta kaalmada adeegyada, fatou veras. So [facility] [phone].    ATENCIÓN: Si habla español, tiene a cooley disposición servicios gratuitos de asistencia lingüística. Мария al [phone].    We comply with applicable federal civil rights laws and [place] laws. We do not discriminate on the basis of race, color, national origin, age, disability, sex, sexual orientation, or gender identity.            Thank you!     Thank you for choosing [facility]  for your care. Our goal is always to provide you with excellent care. Hearing back from our patients is one way we can  continue to improve our services. Please take a few minutes to complete the written survey that you may receive in the mail after your visit with us. Thank you!             Your Updated Medication List - Protect others around you: Learn how to safely use, store and throw away your medicines at www.disposemymeds.org.          This list is accurate as of 9/18/18 11:36 AM.  Always use your most recent med list.                   Brand Name Dispense Instructions for use Diagnosis    AZO TABS PO           LISINOPRIL PO      Take 20 mg by mouth daily        nitroFURantoin (macrocrystal-monohydrate) 100 MG capsule    MACROBID    14 capsule    Take 1 capsule (100 mg) by mouth 2 times daily    Dysuria

## 2018-09-19 LAB
BACTERIA SPEC CULT: ABNORMAL
BACTERIA SPEC CULT: ABNORMAL
Lab: ABNORMAL
SPECIMEN SOURCE: ABNORMAL

## 2018-09-21 DIAGNOSIS — R30.0 DYSURIA: Primary | ICD-10-CM

## 2018-09-21 RX ORDER — SULFAMETHOXAZOLE AND TRIMETHOPRIM 400; 80 MG/1; MG/1
1 TABLET ORAL 2 TIMES DAILY
Qty: 10 TABLET | Refills: 0 | Status: SHIPPED | OUTPATIENT
Start: 2018-09-21 | End: 2018-10-17

## 2018-09-21 NOTE — PROGRESS NOTES
Called pt with results. LM she was on macrobid, but resistant to her culture, so send a new prescription for sulfa Destiny Mina RNC

## 2018-10-15 ENCOUNTER — TRANSFERRED RECORDS (OUTPATIENT)
Dept: HEALTH INFORMATION MANAGEMENT | Facility: CLINIC | Age: 48
End: 2018-10-15

## 2018-10-17 ENCOUNTER — TELEPHONE (OUTPATIENT)
Dept: NURSING | Facility: CLINIC | Age: 48
End: 2018-10-17

## 2018-10-17 ENCOUNTER — OFFICE VISIT (OUTPATIENT)
Dept: OBGYN | Facility: CLINIC | Age: 48
End: 2018-10-17
Payer: COMMERCIAL

## 2018-10-17 ENCOUNTER — TRANSFERRED RECORDS (OUTPATIENT)
Dept: HEALTH INFORMATION MANAGEMENT | Facility: CLINIC | Age: 48
End: 2018-10-17

## 2018-10-17 VITALS
SYSTOLIC BLOOD PRESSURE: 116 MMHG | BODY MASS INDEX: 31.55 KG/M2 | HEIGHT: 69 IN | DIASTOLIC BLOOD PRESSURE: 70 MMHG | WEIGHT: 213 LBS | HEART RATE: 76 BPM

## 2018-10-17 DIAGNOSIS — N92.1 MENORRHAGIA WITH IRREGULAR CYCLE: Primary | ICD-10-CM

## 2018-10-17 PROCEDURE — 99207 ZZC NO CHARGE LOS: CPT | Performed by: OBSTETRICS & GYNECOLOGY

## 2018-10-17 PROCEDURE — 99214 OFFICE O/P EST MOD 30 MIN: CPT | Performed by: OBSTETRICS & GYNECOLOGY

## 2018-10-17 NOTE — PROGRESS NOTES
Patient returns from having her ultrasound at [facility].  Transvaginal ultrasound shows retroverted normal-sized uterus with an 8 mm endometrial thickness.  Both ovaries are normal size.  There is a small 2.2 cm cyst on the right ovary as well as a 2.2 cm subserosal myoma on the right lateral wall of the uterus.  There are no fibroids impinging on the cavity.    Impression: Probable small endometrial polyp.    Plan: I reviewed the findings with the patient.  I suspect she may have a small polyp.  The patient has developed polyps every 6 months.  I discussed both endometrial ablation and subtotal hysterectomy with the patient.  She like to be more definitive in treatment at this time.  My recommendation was for an endometrial ablation.  She will discuss this with her .  We will call her in a week to see if she wishes to schedule.            .He Hooker MD

## 2018-10-17 NOTE — Clinical Note
Please abstract the following data from this visit with this patient into the appropriate field in Epic:  Mammogram done on this date: 2/16/18 (approximately), by this group: Denice Mast, results were abnormal, but all workup was negative.  Pap smear done on this date: 8/22/17 (approximately), by this group: Denice Mast, results were NIL HP Vnegative. Found in care everywhere.

## 2018-10-17 NOTE — TELEPHONE ENCOUNTER
Requested to send order for pelvic US to [facility]- for today per Dr Hooker request.  Completed

## 2018-10-17 NOTE — MR AVS SNAPSHOT
After Visit Summary   10/17/2018    Yesi Vaughan    MRN: [id number]           Patient Information     Date Of Birth          1970        Visit Information        Provider Department      10/17/2018 11:15 AM He Hooker MD [facility]        Today's Diagnoses     Menorrhagia with irregular cycle    -  1       Follow-ups after your visit        Future tests that were ordered for you today     Open Future Orders        Priority Expected Expires Ordered    US Transvaginal Non OB Routine  10/18/2019 10/17/2018            Who to contact     If you have questions or need follow up information about today's clinic visit or your schedule please contact [facility] directly at [phone].  Normal or non-critical lab and imaging results will be communicated to you by MyChart, letter or phone within 4 business days after the clinic has received the results. If you do not hear from us within 7 days, please contact the clinic through [employer]hart or phone. If you have a critical or abnormal lab result, we will notify you by phone as soon as possible.  Submit refill requests through [employer] or call your pharmacy and they will forward the refill request to us. Please allow 3 business days for your refill to be completed.          Additional Information About Your Visit        MyChart Information     [employer] gives you secure access to your electronic health record. If you see a primary care provider, you can also send messages to your care team and make appointments. If you have questions, please call your primary care clinic.  If you do not have a primary care provider, please call [phone] and they will assist you.        Care EveryWhere ID     This is your Care EveryWhere ID. This could be used by other organizations to access your [place] medical records  [id number]        Your Vitals Were     Pulse Height Last Period BMI (Body Mass Index)        "   76 5' 9\" (1.753 m) 10/13/2018 31.45 kg/m2         Blood Pressure from Last 3 Encounters:   10/17/18 116/70   09/18/18 112/66   06/07/18 100/72    Weight from Last 3 Encounters:   10/17/18 213 lb (96.6 kg)   09/18/18 211 lb (95.7 kg)   03/21/18 208 lb (94.3 kg)               Primary Care Provider Office Phone # Fax #    Lyubov Faith [phone]       [facility] [address] [address]        Equal Access to Services     LAUREN MAGALLANES : Hadii lucille mcelroy Sokhurram, waaxda luqadaha, qaybta kaalmada aderadhikayada, fatou veras. So [facility] [phone].    ATENCIÓN: Si habla español, tiene a cooley disposición servicios gratuitos de asistencia lingüística. Llame al [phone].    We comply with applicable federal civil rights laws and [place] laws. We do not discriminate on the basis of race, color, national origin, age, disability, sex, sexual orientation, or gender identity.            Thank you!     Thank you for choosing [facility]  for your care. Our goal is always to provide you with excellent care. Hearing back from our patients is one way we can continue to improve our services. Please take a few minutes to complete the written survey that you may receive in the mail after your visit with us. Thank you!             Your Updated Medication List - Protect others around you: Learn how to safely use, store and throw away your medicines at www.disposemymeds.org.          This list is accurate as of 10/17/18 11:28 AM.  Always use your most recent med list.                   Brand Name Dispense Instructions for use Diagnosis    LISINOPRIL PO      Take 20 mg by mouth daily          "

## 2018-10-17 NOTE — MR AVS SNAPSHOT
After Visit Summary   10/17/2018    Yesi Vaughan    MRN: [id number]           Patient Information     Date Of Birth          1970        Visit Information        Provider Department      10/17/2018 1:45 PM He Hooker MD [facility]        Today's Diagnoses     Menorrhagia with irregular cycle    -  1       Follow-ups after your visit        Future tests that were ordered for you today     Open Future Orders        Priority Expected Expires Ordered    US Transvaginal Non OB Routine  10/18/2019 10/17/2018            Who to contact     If you have questions or need follow up information about today's clinic visit or your schedule please contact [facility] directly at [phone].  Normal or non-critical lab and imaging results will be communicated to you by MyChart, letter or phone within 4 business days after the clinic has received the results. If you do not hear from us within 7 days, please contact the clinic through [employer] or phone. If you have a critical or abnormal lab result, we will notify you by phone as soon as possible.  Submit refill requests through [employer] or call your pharmacy and they will forward the refill request to us. Please allow 3 business days for your refill to be completed.          Additional Information About Your Visit        MyChart Information     [employer] gives you secure access to your electronic health record. If you see a primary care provider, you can also send messages to your care team and make appointments. If you have questions, please call your primary care clinic.  If you do not have a primary care provider, please call [phone] and they will assist you.        Care EveryWhere ID     This is your Care EveryWhere ID. This could be used by other organizations to access your [place] medical records  [id number]        Your Vitals Were     Last Period                   10/13/2018             Blood Pressure from Last 3 Encounters:   10/17/18 116/70   09/18/18 112/66   06/07/18 100/72    Weight from Last 3 Encounters:   10/17/18 213 lb (96.6 kg)   09/18/18 211 lb (95.7 kg)   03/21/18 208 lb (94.3 kg)              Today, you had the following     No orders found for display       Primary Care Provider Office Phone # Fax #    Lyubov Faith [phone]       [facility] [address] [address]        Equal Access to Services     [facility] : Hadii aad ku hadasho Soomaali, waaxda luqadaha, qaybta kaalmada adeegyada, fatou toledo . So [facility] [phone].    ATENCIÓN: Si habla español, tiene a cooley disposición servicios gratuitos de asistencia lingüística. LlGreene [facility] [phone].    We comply with applicable federal civil rights laws and [place] laws. We do not discriminate on the basis of race, color, national origin, age, disability, sex, sexual orientation, or gender identity.            Thank you!     Thank you for choosing [facility] [place]  for your care. Our goal is always to provide you with excellent care. Hearing back from our patients is one way we can continue to improve our services. Please take a few minutes to complete the written survey that you may receive in the mail after your visit with us. Thank you!             Your Updated Medication List - Protect others around you: Learn how to safely use, store and throw away your medicines at www.disposemymeds.org.          This list is accurate as of 10/17/18  2:04 PM.  Always use your most recent med list.                   Brand Name Dispense Instructions for use Diagnosis    LISINOPRIL PO      Take 20 mg by mouth daily

## 2018-10-24 ENCOUNTER — MYC MEDICAL ADVICE (OUTPATIENT)
Dept: OBGYN | Facility: CLINIC | Age: 48
End: 2018-10-24

## 2018-10-29 ENCOUNTER — MYC MEDICAL ADVICE (OUTPATIENT)
Dept: OBGYN | Facility: CLINIC | Age: 48
End: 2018-10-29

## 2018-10-30 DIAGNOSIS — N88.2 CERVICAL STENOSIS (UTERINE CERVIX): Primary | ICD-10-CM

## 2018-10-30 RX ORDER — MISOPROSTOL 200 UG/1
200 TABLET ORAL ONCE
Qty: 1 TABLET | Refills: 0 | Status: SHIPPED | OUTPATIENT
Start: 2018-10-30 | End: 2018-10-30

## 2018-10-30 NOTE — TELEPHONE ENCOUNTER
Patient stopped in office yesterday afternoon, we connected and have her procedure set up for November 15th.  Yang Reeder

## 2018-11-15 ENCOUNTER — OFFICE VISIT (OUTPATIENT)
Dept: OBGYN | Facility: CLINIC | Age: 48
End: 2018-11-15
Payer: COMMERCIAL

## 2018-11-15 DIAGNOSIS — N92.1 MENORRHAGIA WITH IRREGULAR CYCLE: Primary | ICD-10-CM

## 2018-11-15 DIAGNOSIS — Z01.818 PRE-OP EXAM: Primary | ICD-10-CM

## 2018-11-15 LAB — BETA HCG QUAL IFA URINE: NEGATIVE

## 2018-11-15 PROCEDURE — 58563 HYSTEROSCOPY ABLATION: CPT | Performed by: OBSTETRICS & GYNECOLOGY

## 2018-11-15 PROCEDURE — 84703 CHORIONIC GONADOTROPIN ASSAY: CPT | Performed by: NURSE PRACTITIONER

## 2018-11-15 PROCEDURE — 88305 TISSUE EXAM BY PATHOLOGIST: CPT | Performed by: OBSTETRICS & GYNECOLOGY

## 2018-11-15 NOTE — PATIENT INSTRUCTIONS
AFTER THE PROCEDURE      Eat something when you get home    Take a pain pill regardless of pain level and stay on top of the pain as it can be difficult to get discomfort under control after it starts    Take it easy for the rest of the day, perhaps take a nap    Use a heating pad to help with cramping    You can resume normal activities the day after the procedure    Do not drive or operate heavy machinery for at least 24 hours      For the next two weeks    You may experience mild to moderate pelvic cramping (like menstrual cramping)    You may experience spotting and/or watery discharge (use pads, not tampons)    No sexual activity for two weeks post-procedure    CALL OUR OFFICE IF YOU DEVELOP ANY OF THE FOLLOWING SYMPTOMS:      Fever greater than 100.4 degrees Fahrenheit    Worsening pelvic pain    Nausea    Vomiting    Greenish vaginal discharge with odor    Office Phone number:  [phone]    Your post-procedure follow-up visit should be scheduled for 4-6 weeks after the procedure.1

## 2018-11-15 NOTE — MR AVS SNAPSHOT
After Visit Summary   11/15/2018    Yesi Vaughan    MRN: [id number]           Patient Information     Date Of Birth          1970        Visit Information        Provider Department      11/15/2018 9:30 AM He Hooker MD [facility]        Today's Diagnoses     Menorrhagia with irregular cycle    -  1       Follow-ups after your visit        Your next 10 appointments already scheduled     Nov 15, 2018  9:30 AM CST   [facility]ED[facility]    [address] [place] [id number] [phone]              Who to contact     If you have questions or need follow up information about today's clinic visit or your schedule please contact [facility] directly at [phone].  Normal or non-critical lab and imaging results will be communicated to you by MyChart, letter or phone within 4 business days after the clinic has received the results. If you do not hear from us within 7 days, please contact the clinic through MyChart or phone. If you have a critical or abnormal lab result, we will notify you by phone as soon as possible.  Submit refill requests through [employer] or call your pharmacy and they will forward the refill request to us. Please allow 3 business days for your refill to be completed.          Additional Information About Your Visit        MyChart Information     [employer] gives you secure access to your electronic health record. If you see a primary care provider, you can also send messages to your care team and make appointments. If you have questions, please call your primary care clinic.  If you do not have a primary care provider, please call [phone] and they will assist you.        Care EveryWhere ID     This is your Care EveryWhere ID. This could be used by other organizations to access your [facility]  records  [id number]         Blood Pressure from Last 3 Encounters:   10/17/18 116/70   09/18/18 112/66   06/07/18 100/72    Weight from Last 3 Encounters:   10/17/18 213 lb (96.6 kg)   09/18/18 211 lb (95.7 kg)   03/21/18 208 lb (94.3 kg)              We Performed the Following     HYSTEROSCOPY, SURGICAL; W/ ENDOMETRIAL ABLATION, ANY METHOD     Surgical pathology exam        Primary Care Provider Office Phone # Fax #    Lyubov Faith [phone]       [facility] [address] [address]        Equal Access to Services     LAUREN MAGALLANES : Hadii lucille alexandero Sokhurram, waaxda luqadaha, qaybta kaalmada aderadhikayanimco, fatou veras. So [facility] [phone].    ATENCIÓN: Si habla español, tiene a cooley disposición servicios gratuitos de asistencia lingüística. Llame al [phone].    We comply with applicable federal civil rights laws and [place] laws. We do not discriminate on the basis of race, color, national origin, age, disability, sex, sexual orientation, or gender identity.            Thank you!     Thank you for choosing [facility] [place]  for your care. Our goal is always to provide you with excellent care. Hearing back from our patients is one way we can continue to improve our services. Please take a few minutes to complete the written survey that you may receive in the mail after your visit with us. Thank you!             Your Updated Medication List - Protect others around you: Learn how to safely use, store and throw away your medicines at www.disposemymeds.org.          This list is accurate as of 11/15/18  8:44 AM.  Always use your most recent med list.                   Brand Name Dispense Instructions for use Diagnosis    LISINOPRIL PO      Take 20 mg by mouth daily

## 2018-11-15 NOTE — MR AVS SNAPSHOT
After Visit Summary   11/15/2018    Yesi Vaughan    MRN: [id number]           Patient Information     Date Of Birth          1970        Visit Information        Provider Department      11/15/2018 8:00 AM Julia Mina APRN CNP; WE PROC [facility] [place]        Today's Diagnoses     Pre-op exam    -  1      Care Instructions      AFTER THE PROCEDURE      Eat something when you get home    Take a pain pill regardless of pain level and stay on top of the pain as it can be difficult to get discomfort under control after it starts    Take it easy for the rest of the day, perhaps take a nap    Use a heating pad to help with cramping    You can resume normal activities the day after the procedure    Do not drive or operate heavy machinery for at least 24 hours      For the next two weeks    You may experience mild to moderate pelvic cramping (like menstrual cramping)    You may experience spotting and/or watery discharge (use pads, not tampons)    No sexual activity for two weeks post-procedure    CALL OUR OFFICE IF YOU DEVELOP ANY OF THE FOLLOWING SYMPTOMS:      Fever greater than 100.4 degrees Fahrenheit    Worsening pelvic pain    Nausea    Vomiting    Greenish vaginal discharge with odor    Office Phone number:  [phone]    Your post-procedure follow-up visit should be scheduled for 4-6 weeks after the procedure.          Follow-ups after your visit        Your next 10 appointments already scheduled     Nov 15, 2018  9:30 AM CST   PROCEDURE with He Hooker MD   [facility] [place] ([facility] [place])    [address] [place] [address] [id number] [phone]              Who to contact     If you have questions or need follow up information about today's clinic visit or your schedule please contact [facility] directly at [phone].  Normal or non-critical lab and imaging results  will be communicated to you by Agency Entouragehart, letter or phone within 4 business days after the clinic has received the results. If you do not hear from us within 7 days, please contact the clinic through Job2Day or phone. If you have a critical or abnormal lab result, we will notify you by phone as soon as possible.  Submit refill requests through Job2Day or call your pharmacy and they will forward the refill request to us. Please allow 3 business days for your refill to be completed.          Additional Information About Your Visit        Job2Day Information     Job2Day gives you secure access to your electronic health record. If you see a primary care provider, you can also send messages to your care team and make appointments. If you have questions, please call your primary care clinic.  If you do not have a primary care provider, please call [phone] and they will assist you.        Care EveryWhere ID     This is your Care EveryWhere ID. This could be used by other organizations to access your [facility] medical records  [id number]         Blood Pressure from Last 3 Encounters:   10/17/18 116/70   09/18/18 112/66   06/07/18 100/72    Weight from Last 3 Encounters:   10/17/18 213 lb (96.6 kg)   09/18/18 211 lb (95.7 kg)   03/21/18 208 lb (94.3 kg)              We Performed the Following     Beta HCG Qual, Urine - [facility] and [place] ([id number])        Primary Care Provider Office Phone # Fax #    Lyubov Faith [phone]       [facility] [place] [address] [place] [address]        Equal Access to Services     [facility]: Hadii aad ku hadasho Soomaali, waaxda luqadaha, qaybta kaalmada adeegyada, fatou herndon haysherine toledo . So [facility] [phone].    ATENCIÓN: Si habla español, tiene a cooley disposición servicios gratuitos de asistencia lingüística. Llame al [phone].    We comply with applicable federal civil rights laws and [place] laws. We do not discriminate on the  basis of race, color, national origin, age, disability, sex, sexual orientation, or gender identity.            Thank you!     Thank you for choosing [facility]  for your care. Our goal is always to provide you with excellent care. Hearing back from our patients is one way we can continue to improve our services. Please take a few minutes to complete the written survey that you may receive in the mail after your visit with us. Thank you!             Your Updated Medication List - Protect others around you: Learn how to safely use, store and throw away your medicines at www.disposemymeds.org.          This list is accurate as of 11/15/18  8:52 AM.  Always use your most recent med list.                   Brand Name Dispense Instructions for use Diagnosis    LISINOPRIL PO      Take 20 mg by mouth daily

## 2018-11-15 NOTE — PROGRESS NOTES
PREOPERATIVE DIAGNOSIS:  Menorrhagia     POSTOPERATIVE DIAGNOSIS:  Same     PROCEDURE:  Hysteroscopy, D&C, Gwendolyn endometrial Ablation     ANESTHESIA:  Conscious sedation    SURGEON:  He Hooker MD    EBL: Minimal    Complications: None apparent    Findings: 8.5 cm uterine cavity.  No descent.  A somewhat polypoid lining which was biopsied.  Otherwise no other intracavitary pathology.         OPERATIVE DESCRIPTION: Patient was taken to the Procedure room  She was then placed in the dorsal lithotomy position with legs in the yellow-fin stirrups and prepped and draped in the normal sterile fashion.  Anesthesia was administered. The bladder was not  emptied with straight catheter.    Speculum was placed and cervix visualized and the anterior lip was anesthetized with 1% lidocaine.  The anterior lip was grasped with a single tooth tenaculum.  The uterus was then sounded to 8.5cm with an endometrial biopsy pipelle.  The cervix was then sequentially dilated to 7 mm with Katalina dilators.  The hysteroscope was placed and the above findings noted.  The hysteroscope was then removed and polyp forceps were used to remove a small amount of tissue that was sent for pathology.      The cervix was then sequentially dilated to 7 mm with Katalina Dilators.  The uterine cavity length was calculated to be 4.5cm.  The Gwendolyn device was then placed through the cervix to the fundus and withdrawn slightly and deployed.  The device was gently rocked back and forth as it was deployed and after it was deployed to seat the device in the uterus. The cavity assessment was done and passed.  The Gwendolyn was then enabled and the ablation completed over 120 seconds. The Gwendolyn device was then removed from the endometrial cavity.     The hysteroscope was replaced into the uterus and evidence of ablation noted.  The hysteroscope was removed.  The tenaculum was then removed and then the speculum removed.    Patient tolerated the procedure well.   EBL was minimal.      He Hooker MD

## 2018-11-15 NOTE — PROGRESS NOTES
PRE-PROCEDURE    Yesi Vaughan is a 48 year old female who presents today for a jad ablation procedure.  She is here with her  who is responsible for driving her home.  The procedure was reviewed and the patient's questions were answered accordingly.  Restrictive garments were removed.  She has been NPO since 8:00 pm last night.     UCG performed:  Yes - negative,      Cytotec 200 mcg was taken last night?  Yes  Vitals: There were no vitals taken for this visit.  BMI= There is no height or weight on file to calculate BMI.    Allergies were reviewed:   No Known Allergies    POST-PROCEDURE     ANALGESIA SIDE EFFECTS MONITORING:  Awake and alert    Last sedation medication was given at:  0830  Discharged with  at 0905 on 11/15/2018    Pain scale:  2/10        The vitals were repeated following the procedure every 15 minutes:    TIME: 0800 TIME:  0820 TIME:  0850   BP:  148/96  PULSE:  80  RESPIRATIONS:    TEMPERATURE:  97.8  O2 SATS:  98 BP:  123/79  PULSE:  70  RESPIRATIONS:  18  TEMPERATURE:    O2 SATS:  99 BP:  112/77  PULSE:  61  RESPIRATIONS:  20  TEMPERATURE:    O2 SATS:  96       The patient was comfortable and in no pain.  She was taking fluids and able to empty her bladder.  She was awake, able to dress independently and deemed safe to be discharged home with her .  She was able to speak clearly, verbalize and understand her instructions.     PATIENT INSTRUCTIONS:      AFTER THE PROCEDURE      Eat something when you get home    Take a pain pill regardless of pain level and stay on top of the pain as it can be difficult to get discomfort under control after it starts    Take it easy for the rest of the day, perhaps take a nap    Use a heating pad to help with cramping    You can resume normal activities the day after the procedure    Do not drive or operate heavy machinery for at least 24 hours      For the next two weeks    You may experience mild to moderate pelvic cramping  (like menstrual cramping)    You may experience spotting and/or watery discharge (use pads, not tampons)    No sexual activity for two weeks post-procedure    CALL OUR OFFICE IF YOU DEVELOP ANY OF THE FOLLOWING SYMPTOMS:      Fever greater than 100.4 degrees Fahrenheit    Worsening pelvic pain    Nausea    Vomiting    Greenish vaginal discharge with odor    Office Phone number:  [phone]    Your post-procedure follow-up visit should be scheduled for 4-6 weeks after the procedure.    Destiny Mina RNC

## 2018-11-16 LAB — COPATH REPORT: NORMAL

## 2018-11-27 ENCOUNTER — MYC MEDICAL ADVICE (OUTPATIENT)
Dept: OBGYN | Facility: CLINIC | Age: 48
End: 2018-11-27

## 2018-11-27 NOTE — TELEPHONE ENCOUNTER
Please see my chart message below. Routing to Destiny Mina.     FINAL DIAGNOSIS:   Endometrium, biopsy   - Endometrial polyp with tubal metaplastic change, benign proliferative   endometrium, no evidence of malignancy

## 2018-11-28 ENCOUNTER — OFFICE VISIT (OUTPATIENT)
Dept: OBGYN | Facility: CLINIC | Age: 48
End: 2018-11-28
Payer: COMMERCIAL

## 2018-11-28 VITALS
SYSTOLIC BLOOD PRESSURE: 132 MMHG | HEIGHT: 69 IN | DIASTOLIC BLOOD PRESSURE: 88 MMHG | WEIGHT: 215 LBS | HEART RATE: 80 BPM | BODY MASS INDEX: 31.84 KG/M2

## 2018-11-28 DIAGNOSIS — R30.0 DYSURIA: Primary | ICD-10-CM

## 2018-11-28 LAB
ALBUMIN UR-MCNC: NEGATIVE MG/DL
APPEARANCE UR: CLEAR
BILIRUB UR QL STRIP: NEGATIVE
COLOR UR AUTO: YELLOW
GLUCOSE UR STRIP-MCNC: NEGATIVE MG/DL
HGB UR QL STRIP: ABNORMAL
KETONES UR STRIP-MCNC: ABNORMAL MG/DL
LEUKOCYTE ESTERASE UR QL STRIP: ABNORMAL
NITRATE UR QL: NEGATIVE
PH UR STRIP: 6.5 PH (ref 5–7)
SOURCE: ABNORMAL
SP GR UR STRIP: >1.03 (ref 1–1.03)
UROBILINOGEN UR STRIP-ACNC: 0.2 EU/DL (ref 0.2–1)

## 2018-11-28 PROCEDURE — 87086 URINE CULTURE/COLONY COUNT: CPT | Performed by: NURSE PRACTITIONER

## 2018-11-28 PROCEDURE — 81003 URINALYSIS AUTO W/O SCOPE: CPT | Performed by: NURSE PRACTITIONER

## 2018-11-28 PROCEDURE — 99213 OFFICE O/P EST LOW 20 MIN: CPT | Performed by: NURSE PRACTITIONER

## 2018-11-28 RX ORDER — NITROFURANTOIN 25; 75 MG/1; MG/1
100 CAPSULE ORAL 2 TIMES DAILY
Qty: 14 CAPSULE | Refills: 0 | Status: SHIPPED | OUTPATIENT
Start: 2018-11-28 | End: 2024-05-10

## 2018-11-28 RX ORDER — LISINOPRIL 20 MG/1
TABLET ORAL
COMMUNITY
Start: 2018-11-08

## 2018-11-28 NOTE — PROGRESS NOTES
SUBJECTIVE:                                                   Yesi Vaughan is a 48 year old female who presents to clinic today for the following health issue(s):  Patient presents with:  Urinary Problem: c/o frequency and constant burning/stinging, especially during urination      HPI:  Pt here today with c/o urinary burning and frequency that started acutely this afternoon.     She had an endometrial ablation 2 weeks ago. Still has a bit of pink tinged discharge. Denies any vaginal symptoms.     Patient's last menstrual period was 10/13/2018 (approximate)..   Patient isn't currently sexually active, .  Using not sexually active for contraception.    reports that she has never smoked. She has never used smokeless tobacco.    STD testing offered?  Declined    Health maintenance updated:  yes      Problem list and histories reviewed & adjusted, as indicated.  Additional history: as documented.    There is no problem list on file for this patient.    Past Surgical History:   Procedure Laterality Date     AS ESOPHAGOSCOPY, DIAGNOSTIC       OPERATIVE HYSTEROSCOPY WITH MORCELLATOR N/A 2017    Procedure: OPERATIVE HYSTEROSCOPY WITH MORCELLATOR (MYOSURE);  OPERATIVE HYSTEROSCOPY WITH MYOSURE MORCELLATOR;  Surgeon: He Hooker MD;  Location: [facility]     OPERATIVE HYSTEROSCOPY WITH MORCELLATOR N/A 2017    Procedure: OPERATIVE HYSTEROSCOPY WITH MORCELLATOR (MYOSURE);  HYSTEROSCOPY WITH MYOSURE ;  Surgeon: He Hooker MD;  Location: [facility]     wisdom teeth        Social History   Substance Use Topics     Smoking status: Never Smoker     Smokeless tobacco: Never Used     Alcohol use No      Problem (# of Occurrences) Relation (Name,Age of Onset)    Diabetes (1) Maternal Grandfather    Hypertension (1) Mother    Lung Cancer (1) Father    Osteoporosis (1) Father    Skin Cancer (1) Mother            Current Outpatient Prescriptions   Medication Sig     lisinopril  "(PRINIVIL/ZESTRIL) 20 MG tablet TAKE 1 TABLET BY MOUTH EVERY DAY     nitroFURantoin macrocrystal-monohydrate (MACROBID) 100 MG capsule Take 1 capsule (100 mg) by mouth 2 times daily     No current facility-administered medications for this visit.      No Known Allergies    ROS:  12 point review of systems negative other than symptoms noted below.    OBJECTIVE:     /88  Pulse 80  Ht 5' 9\" (1.753 m)  Wt 215 lb (97.5 kg)  LMP 10/13/2018 (Approximate)  BMI 31.75 kg/m2  Body mass index is 31.75 kg/(m^2).    Exam:  Constitutional:  Appearance: Well nourished, well developed alert, in no acute distress  Neurologic/Psychiatric:  Mental Status:  Oriented X3      In-Clinic Test Results:  Results for orders placed or performed in visit on 11/28/18 (from the past 24 hour(s))   UA without Microscopic   Result Value Ref Range    Color Urine Yellow     Appearance Urine Clear     Glucose Urine Negative NEG^Negative mg/dL    Bilirubin Urine Negative NEG^Negative    Ketones Urine Trace (A) NEG^Negative mg/dL    Specific Gravity Urine >1.030 1.003 - 1.035    Blood Urine 1+ (A) NEG^Negative    pH Urine 6.5 5.0 - 7.0 pH    Protein Albumin Urine Negative NEG^Negative mg/dL    Urobilinogen Urine 0.2 0.2 - 1.0 EU/dL    Nitrite Urine Negative NEG^Negative    Leukocyte Esterase Urine 1+ (A) NEG^Negative    Source Midstream Urine        ASSESSMENT/PLAN:                                                        ICD-10-CM    1. Dysuria R30.0 UA without Microscopic     Urine Culture Aerobic Bacterial     nitroFURantoin macrocrystal-monohydrate (MACROBID) 100 MG capsule       There are no Patient Instructions on file for this visit.    UA +, will treat and send UC. Encouraged increased H2O intake. Pt has pyridium at home if needed.    CASTILLO Ramirez [facility]  "

## 2018-11-28 NOTE — MR AVS SNAPSHOT
After Visit Summary   11/28/2018    Yesi Vaughan    MRN: [id number]           Patient Information     Date Of Birth          1970        Visit Information        Provider Department      11/28/2018 3:00 PM Flaca Hernandez APRN CNP [facility]        Today's Diagnoses     Dysuria    -  1       Follow-ups after your visit        Your next 10 appointments already scheduled     Dec 12, 2018  3:15 PM CST   SHORT with He Hooker MD   [facility] ([facility])    [address] [place] [phone]              Who to contact     If you have questions or need follow up information about today's clinic visit or your schedule please contact [facility] directly at [phone].  Normal or non-critical lab and imaging results will be communicated to you by MyChart, letter or phone within 4 business days after the clinic has received the results. If you do not hear from us within 7 days, please contact the clinic through MyChart or phone. If you have a critical or abnormal lab result, we will notify you by phone as soon as possible.  Submit refill requests through ElationEMR or call your pharmacy and they will forward the refill request to us. Please allow 3 business days for your refill to be completed.          Additional Information About Your Visit        MyChart Information     ElationEMR gives you secure access to your electronic health record. If you see a primary care provider, you can also send messages to your care team and make appointments. If you have questions, please call your primary care clinic.  If you do not have a primary care provider, please call [phone] and they will assist you.        Care EveryWhere ID     This is your Care EveryWhere ID. This could be used by other organizations to access your [facility] medical records  [id number]        Your  "Vitals Were     Pulse Height Last Period BMI (Body Mass Index)          80 5' 9\" (1.753 m) 10/13/2018 (Approximate) 31.75 kg/m2         Blood Pressure from Last 3 Encounters:   11/28/18 132/88   10/17/18 116/70   09/18/18 112/66    Weight from Last 3 Encounters:   11/28/18 215 lb (97.5 kg)   10/17/18 213 lb (96.6 kg)   09/18/18 211 lb (95.7 kg)              We Performed the Following     UA without Microscopic     Urine Culture Aerobic Bacterial          Today's Medication Changes          These changes are accurate as of 11/28/18  3:17 PM.  If you have any questions, ask your nurse or doctor.               Start taking these medicines.        Dose/Directions    nitroFURantoin macrocrystal-monohydrate 100 MG capsule   Commonly known as:  MACROBID   Used for:  Dysuria   Started by:  Flaca Hernandez APRN CNP        Dose:  100 mg   Take 1 capsule (100 mg) by mouth 2 times daily   Quantity:  14 capsule   Refills:  0         These medicines have changed or have updated prescriptions.        Dose/Directions    lisinopril 20 MG tablet   Commonly known as:  PRINIVIL/ZESTRIL   This may have changed:  Another medication with the same name was removed. Continue taking this medication, and follow the directions you see here.   Changed by:  Flaca Hernandez APRN CNP        TAKE 1 TABLET BY MOUTH EVERY DAY   Refills:  0            Where to get your medicines      These [facility] [place] [address] [place] [facility] [address]     Phone:  [phone]     nitroFURantoin macrocrystal-monohydrate 100 MG capsule                Primary Care Provider Office Phone # Fax #    Lyubov Faith [phone]       [facility] [address]        Equal Access to Services     LAUREN MAGALLANES AH: Tammy alexandero Soomaali, waaxda luqadaha, qaybta kaalmada adeegyada, waxay yanick veras. So wa " [phone].    ATENCIÓN: Si lacy bhatti, tiene a cooley disposición servicios gratuitos de asistencia lingüística. Мария al [phone].    We comply with applicable federal civil rights laws and [place] laws. We do not discriminate on the basis of race, color, national origin, age, disability, sex, sexual orientation, or gender identity.            Thank you!     Thank you for choosing [facility]  for your care. Our goal is always to provide you with excellent care. Hearing back from our patients is one way we can continue to improve our services. Please take a few minutes to complete the written survey that you may receive in the mail after your visit with us. Thank you!             Your Updated Medication List - Protect others around you: Learn how to safely use, store and throw away your medicines at www.disposemymeds.org.          This list is accurate as of 11/28/18  3:17 PM.  Always use your most recent med list.                   Brand Name Dispense Instructions for use Diagnosis    lisinopril 20 MG tablet    PRINIVIL/ZESTRIL     TAKE 1 TABLET BY MOUTH EVERY DAY        nitroFURantoin macrocrystal-monohydrate 100 MG capsule    MACROBID    14 capsule    Take 1 capsule (100 mg) by mouth 2 times daily    Dysuria

## 2018-11-30 LAB
BACTERIA SPEC CULT: NORMAL
Lab: NORMAL
SPECIMEN SOURCE: NORMAL

## 2018-12-11 ENCOUNTER — OFFICE VISIT (OUTPATIENT)
Dept: OBGYN | Facility: CLINIC | Age: 48
End: 2018-12-11
Payer: COMMERCIAL

## 2018-12-11 VITALS
BODY MASS INDEX: 31.4 KG/M2 | HEIGHT: 69 IN | DIASTOLIC BLOOD PRESSURE: 74 MMHG | WEIGHT: 212 LBS | HEART RATE: 80 BPM | SYSTOLIC BLOOD PRESSURE: 112 MMHG

## 2018-12-11 DIAGNOSIS — N92.0 MENORRHAGIA WITH REGULAR CYCLE: Primary | ICD-10-CM

## 2018-12-11 PROCEDURE — 99213 OFFICE O/P EST LOW 20 MIN: CPT | Performed by: OBSTETRICS & GYNECOLOGY

## 2018-12-11 ASSESSMENT — MIFFLIN-ST. JEOR: SCORE: 1661.01

## 2018-12-11 NOTE — PROGRESS NOTES
SUBJECTIVE:                                                   Yesi Vaughan is a 48 year old female who presents to clinic today for the following health issue(s):  Patient presents with:  Post-op Visit: Hysteroscopy, D&C, Gwendolyn endometrial Ablation, on 11/15/18.         HPI:  Patient is seen following her endometrial ablation on 11/15/2018.  She is having minimal discharge slightly pinkish.  She feels well.  She has no cramping.  She said no menses since the ablation.      No LMP recorded. Patient has had an ablation..   Patient is not sexually active, .  Using none for contraception.    reports that  has never smoked. she has never used smokeless tobacco.    STD testing offered?  Declined    Health maintenance updated:  yes    Today's PHQ-2 Score:   PHQ-2 (  [employer]) 2017   Q1: Little interest or pleasure in doing things 0   Q2: Feeling down, depressed or hopeless 0   PHQ-2 Score 0     Today's PHQ-9 Score:   PHQ-9 SCORE 2017   PHQ-9 Total Score 1     Today's CRISSY-7 Score:   CRISSY-7 SCORE 2017   Total Score 0       Problem list and histories reviewed & adjusted, as indicated.  Additional history: as documented.    There is no problem list on file for this patient.    Past Surgical History:   Procedure Laterality Date     AS ESOPHAGOSCOPY, DIAGNOSTIC       OPERATIVE HYSTEROSCOPY WITH MORCELLATOR N/A 2017    Procedure: OPERATIVE HYSTEROSCOPY WITH MORCELLATOR (MYOSURE);  OPERATIVE HYSTEROSCOPY WITH MYOSURE MORCELLATOR;  Surgeon: He Hooker MD;  Location: [facility]     OPERATIVE HYSTEROSCOPY WITH MORCELLATOR N/A 2017    Procedure: OPERATIVE HYSTEROSCOPY WITH MORCELLATOR (MYOSURE);  HYSTEROSCOPY WITH MYOSURE ;  Surgeon: He Hooker MD;  Location: [facility]     wisdom teeth        Social History     Tobacco Use     Smoking status: Never Smoker     Smokeless tobacco: Never Used   Substance Use Topics     Alcohol use: No      Problem (# of Occurrences) Relation  "(Name,Age of Onset)    Diabetes (1) Maternal Grandfather    Hypertension (1) Mother    Lung Cancer (1) Father    Osteoporosis (1) Father    Skin Cancer (1) Mother            Current Outpatient Medications   Medication Sig     lisinopril (PRINIVIL/ZESTRIL) 20 MG tablet TAKE 1 TABLET BY MOUTH EVERY DAY     nitroFURantoin macrocrystal-monohydrate (MACROBID) 100 MG capsule Take 1 capsule (100 mg) by mouth 2 times daily     No current facility-administered medications for this visit.      No Known Allergies    ROS:  12 point review of systems negative other than symptoms noted below.    OBJECTIVE:     /74 (BP Location: Right arm, Patient Position: Sitting, Cuff Size: Adult Large)   Pulse 80   Ht 1.753 m (5' 9\")   Wt 96.2 kg (212 lb)   Breastfeeding? No   BMI 31.31 kg/m    Body mass index is 31.31 kg/m .    Exam:  Constitutional:  Appearance: Well nourished, well developed alert, in no acute distress  Chest:  Respiratory Effort:  Breathing unlabored  Gastrointestinal:  Abdominal Examination:  Abdomen nontender to palpation, tone normal without rigidity or guarding, no masses present, umbilicus without lesions; Liver/Spleen:  No hepatomegaly present, liver nontender to palpation; Hernias:  No hernias present  Lymphatic: Lymph Nodes:  No other lymphadenopathy present  Skin:General Inspection:  No rashes present, no lesions present, no areas of discoloration; Genitalia and Groin:  No rashes present, no lesions present, no areas of discoloration, no masses present.  Neurologic/Psychiatric:  Mental Status:  Oriented X3   Pelvic Exam:  External Genitalia:     Normal appearance for age, no discharge present, no tenderness present, no inflammatory lesions present, color normal  Vagina:     Normal vaginal vault without central or paravaginal defects, no discharge present, no inflammatory lesions present, no masses present  Bladder:     Nontender to palpation  Urethra:   Urethral Body:  Urethra palpation normal, urethra " structural support normal   Urethral Meatus:  No erythema or lesions present  Cervix:     Appearance healthy, no lesions present, nontender to palpation, no bleeding present  Uterus:     Uterus: firm, normal sized and nontender, midplane in position.   Adnexa:     No adnexal tenderness present, no adnexal masses present  Perineum:     Perineum within normal limits, no evidence of trauma, no rashes or skin lesions present  Anus:     Anus within normal limits, no hemorrhoids present  Inguinal Lymph Nodes:     No lymphadenopathy present  Pubic Hair:     Normal pubic hair distribution for age  Genitalia and Groin:     No rashes present, no lesions present, no areas of discoloration, no masses present       In-Clinic Test Results:  No results found for this or any previous visit (from the past 24 hour(s)).    ASSESSMENT/PLAN:                                                        ICD-10-CM    1. Menorrhagia with regular cycle N92.0            Plan: Patient will return to clinic as needed or for her yearly exam.    He Hooker MD  [facility] [place]

## 2018-12-20 ENCOUNTER — MYC MEDICAL ADVICE (OUTPATIENT)
Dept: OBGYN | Facility: CLINIC | Age: 48
End: 2018-12-20

## 2018-12-20 NOTE — TELEPHONE ENCOUNTER
This is probably a period.  Usually takes up to 3 months to see what her menstrual cycle would be like after the ablation.  As long since not heavier than a normal menses it is okay.    He Hooker MD

## 2018-12-31 ENCOUNTER — HOSPITAL ENCOUNTER (OUTPATIENT)
Dept: ULTRASOUND IMAGING | Facility: CLINIC | Age: 48
Discharge: HOME | End: 2018-12-31
Attending: SPECIALIST | Admitting: SPECIALIST
Payer: COMMERCIAL

## 2018-12-31 DIAGNOSIS — E04.2 MULTINODULAR GOITER: ICD-10-CM

## 2018-12-31 PROCEDURE — 76536 US EXAM OF HEAD AND NECK: CPT

## 2020-02-23 ENCOUNTER — HEALTH MAINTENANCE LETTER (OUTPATIENT)
Age: 50
End: 2020-02-23

## 2020-10-26 ENCOUNTER — HOSPITAL ENCOUNTER (OUTPATIENT)
Dept: ULTRASOUND IMAGING | Facility: CLINIC | Age: 50
Discharge: HOME | End: 2020-10-26
Attending: SPECIALIST | Admitting: SPECIALIST
Payer: COMMERCIAL

## 2020-10-26 DIAGNOSIS — E04.2 MULTINODULAR GOITER: ICD-10-CM

## 2020-10-26 PROCEDURE — 76536 US EXAM OF HEAD AND NECK: CPT

## 2021-02-14 ENCOUNTER — HEALTH MAINTENANCE LETTER (OUTPATIENT)
Age: 51
End: 2021-02-14

## 2021-04-10 ENCOUNTER — HEALTH MAINTENANCE LETTER (OUTPATIENT)
Age: 51
End: 2021-04-10

## 2021-09-25 ENCOUNTER — HEALTH MAINTENANCE LETTER (OUTPATIENT)
Age: 51
End: 2021-09-25

## 2022-03-12 ENCOUNTER — HEALTH MAINTENANCE LETTER (OUTPATIENT)
Age: 52
End: 2022-03-12

## 2022-05-07 ENCOUNTER — HEALTH MAINTENANCE LETTER (OUTPATIENT)
Age: 52
End: 2022-05-07

## 2022-06-20 ENCOUNTER — HOSPITAL ENCOUNTER (OUTPATIENT)
Dept: ULTRASOUND IMAGING | Facility: CLINIC | Age: 52
Discharge: HOME | End: 2022-06-20
Attending: INTERNAL MEDICINE | Admitting: INTERNAL MEDICINE
Payer: COMMERCIAL

## 2022-06-20 DIAGNOSIS — E04.2 MULTINODULAR GOITER: ICD-10-CM

## 2022-06-20 DIAGNOSIS — R73.03 PREDIABETES: ICD-10-CM

## 2022-06-20 PROCEDURE — 76536 US EXAM OF HEAD AND NECK: CPT

## 2022-12-26 ENCOUNTER — HEALTH MAINTENANCE LETTER (OUTPATIENT)
Age: 52
End: 2022-12-26

## 2023-04-22 ENCOUNTER — HEALTH MAINTENANCE LETTER (OUTPATIENT)
Age: 53
End: 2023-04-22

## 2023-06-02 ENCOUNTER — HEALTH MAINTENANCE LETTER (OUTPATIENT)
Age: 53
End: 2023-06-02

## 2024-02-14 NOTE — PROGRESS NOTES
SUBJECTIVE:                                                   Yesi Vaughan is a 48 year old female who presents to clinic today for the following health issue(s):  Patient presents with:  Vaginal Bleeding: increased bleeding the last few days, has history of fibroids        HPI:  Patient is seen today for menorrhagia.  She started a period on  which was very heavy.  She had skipped her period in September.  She did not have terrible cramping.  Patient has a history of recurring endometrial polyps.  1 of the polyps had shown simple hyperplasia.  Her latest biopsy did not show any evidence of hyperplasia.    Patient's last menstrual period was 10/13/2018..   Patient is not sexually active, .  Using not sexually active for contraception.    reports that she has never smoked. She has never used smokeless tobacco.    STD testing offered?  Declined    Health maintenance updated:  yes    Today's PHQ-2 Score:   PHQ-2 (  [employer]) 2017   Q1: Little interest or pleasure in doing things 0   Q2: Feeling down, depressed or hopeless 0   PHQ-2 Score 0     Today's PHQ-9 Score:   PHQ-9 SCORE 2017   Total Score 1     Today's CRISSY-7 Score:   CRISSY-7 SCORE 2017   Total Score 0       Problem list and histories reviewed & adjusted, as indicated.  Additional history: as documented.    There is no problem list on file for this patient.    Past Surgical History:   Procedure Laterality Date     AS ESOPHAGOSCOPY, DIAGNOSTIC       OPERATIVE HYSTEROSCOPY WITH MORCELLATOR N/A 2017    Procedure: OPERATIVE HYSTEROSCOPY WITH MORCELLATOR (MYOSURE);  OPERATIVE HYSTEROSCOPY WITH MYOSURE MORCELLATOR;  Surgeon: He Hooker MD;  Location: [facility]     OPERATIVE HYSTEROSCOPY WITH MORCELLATOR N/A 2017    Procedure: OPERATIVE HYSTEROSCOPY WITH MORCELLATOR (MYOSURE);  HYSTEROSCOPY WITH MYOSURE ;  Surgeon: He Hooker MD;  Location: [facility]     wisdom teeth        Social History  "  Substance Use Topics     Smoking status: Never Smoker     Smokeless tobacco: Never Used     Alcohol use No      Problem (# of Occurrences) Relation (Name,Age of Onset)    Diabetes (1) Maternal Grandfather    Hypertension (1) Mother    Lung Cancer (1) Father    Osteoporosis (1) Father    Skin Cancer (1) Mother            Current Outpatient Prescriptions   Medication Sig     LISINOPRIL PO Take 20 mg by mouth daily     No current facility-administered medications for this visit.      No Known Allergies    ROS:  12 point review of systems negative other than symptoms noted below.  Genitourinary: Heavy Bleeding with Period and Irregular Menses    OBJECTIVE:     /70  Pulse 76  Ht 5' 9\" (1.753 m)  Wt 213 lb (96.6 kg)  LMP 10/13/2018  BMI 31.45 kg/m2  Body mass index is 31.45 kg/(m^2).    Exam:  Constitutional:  Appearance: Well nourished, well developed alert, in no acute distress  Chest:  Respiratory Effort:  Breathing unlabored  Cardiovascular: No edema  Gastrointestinal:  Abdominal Examination:  Abdomen nontender to palpation, tone normal without rigidity or guarding, no masses present, umbilicus without lesions; Liver/Spleen:  No hepatomegaly present, liver nontender to palpation; Hernias:  No hernias present  Lymphatic: Lymph Nodes:  No other lymphadenopathy present  Skin:General Inspection:  No rashes present, no lesions present, no areas of discoloration; .  Neurologic/Psychiatric:  Mental Status:  Oriented X3   Pelvic Exam:  External Genitalia:     Normal appearance for age, no discharge present, no tenderness present, no inflammatory lesions present, color normal  Vagina:     Normal vaginal vault without central or paravaginal defects, old blood in vault   bladder:     Nontender to palpation  Urethra:   Urethral Body:  Urethra palpation normal, urethra structural support normal   Urethral Meatus:  No erythema or lesions present  Cervix:     Appearance healthy, no lesions present, nontender to " palpation,   Uterus:     Uterus: firm, normal sized and nontender, midplane in position.   Adnexa:     No adnexal tenderness present, no adnexal masses present  Perineum:     Perineum within normal limits, no evidence of trauma, no rashes or skin lesions present  Anus:     Anus within normal limits, no hemorrhoids present  Inguinal Lymph Nodes:     No lymphadenopathy present  Pubic Hair:     Normal pubic hair distribution for age  Genitalia and Groin:     No rashes present, no lesions present, no areas of discoloration, no masses present       In-Clinic Test Results:  No results found for this or any previous visit (from the past 24 hour(s)).    ASSESSMENT/PLAN:                                                        ICD-10-CM    1. Menorrhagia with irregular cycle N92.1 US Transvaginal Non OB           Plan: The patient will be getting an ultrasound.  I suspect she may have recurrent polyps.  I did discuss the possibility of doing something more definitive such as an endometrial ablation or even a subtotal hysterectomy.  We will discuss this more after her ultrasound.    He Hooker MD  [facility] [place]   15

## 2024-05-02 NOTE — PROGRESS NOTES
"New Medical Weight Management Consult    PATIENT:  Yesi Vaughan   MRN:         [id number]   :         1970  KEITH:         5/10/2024      Dear MARK QIU,    I had the pleasure of seeing your patient, Yesi Vaughan. Full intake/assessment was done to determine barriers to weight loss success and develop a treatment plan. Yesi Vaughan is a 53 year old female interested in treatment of medical problems associated with excess weight. She has a height of 5' 9\", a weight of 260 lbs 6.4 oz, and the calculated Body mass index is 38.45 kg/m .    Assessment & Plan   Problem List Items Addressed This Visit       Prediabetes    Relevant Orders    Hemoglobin A1c [LAB90] (Future) (Completed)    Class 2 severe obesity due to excess calories with serious comorbidity in adult (H) - Primary     Initial MWM. Referred to dietitian. Repeat labs ordered. Discussed wegovy - Discussed mechanism of action, common side effects, titration guidelines, and  discussed risks for pancreatitis/gallbladder problems/gastroparesis/low sugars/MTC/MEN2. Medication handout given in AVS    Would like to recheck labs, read about Wegovy and connect on [employer] if interested in starting after labs          Other Visit Diagnoses       Class 2 severe obesity with serious comorbidity and body mass index (BMI) of 38.0 to 38.9 in adult, unspecified obesity type (H)        Relevant Orders    Comprehensive metabolic panel [LAB17] (Future) (Completed)    Hemoglobin A1c [LAB90] (Future) (Completed)    TSH with free T4 reflex (Completed)    Vitamin D Deficiency [BOT476] (Future)    Nutrition Referral             PROGRAM OVERVIEW  Reviewed options at [facility].   All questions were answered. Education provided on chronic disease management of obesity.    SURGICAL WEIGHT LOSS   Option presented given pt BMI and current comorbid conditions. No current interest.        MEDICATIONS:  We discussed healthy habits to assist " "with weight loss. We reviewed medications associated with weight gain. We discussed the role of pharmacological agents in the treatment of obesity and the \"off-label\" use of medications in this practice. We reviewed medication that may assist with weight loss. Indications, contraindications, risks/benefits, and potential side effects were discussed.   Wegovy discussed - Discussed mechanism of action, common side effects, titration guidelines, and  discussed risks for pancreatitis/gallbladder problems/gastroparesis/low sugars/MTC/MEN2. Medication handout given in AVS. Plan to connecto n [employer] after labs if interested . Discussed that medications must always be used together with lifestyle changes. Reviewed rationale for long term use of pharmacotherapy in chronic disease management for obesity.      AOM Considerations:  Phentermine:  Avoid - reported hx of valve disease PCP note 2021 - MVP? (But maybe following echo clear?) Monitor blood pressure with lisinopril  Topiramate:  Labs and hydration with lisinopril  GLP-1: Monitor sugars, noted nodular goiter - seeing endocrine - followed for 20+ years and no concerns for cancer and denies personal/fam hx of MTC/MEN2     Naltrexone:  No interaction seen  Wellbutrin:    Metformin:  Labs and hydration with lisinopril  Contrave:  Qsymia:              PATIENT INSTRUCTIONS:  Pt Instructions:  Labs ordered.  Please call [phone] to set up a lab appt.   Read about Wegovy below, let's have labs repeated and connect on [email or web address].   You can call your insurance and see if coverage for Wegovy, Zepbound, or Saxenda.     Goals:  I recommend eating breakfast within an hour of waking up and eating every 4-6 hours during the day and try minimize snacking between meals. Prioritizing veggies and proteins for food choices is also recommended as medically appropriate.  Recommend 64oz (2L) water daily as medically appropriate (6-8 glasses)  Recommend pursing regular exercise. 5 minutes of " "exercise every other day or every 2 days is a great place to start with aiming for 30 minutes 5 times a week as an end goal.  If you can, try to get some strength training twice weekly while losing weight to help preserve your muscle!      Follow up:    Call [phone] to schedule next visit in 3-4 months.     64 minutes spent on the date of the encounter doing chart review, history and exam, review test results, counseling, developing plan of care, documentation, and further activities as noted above.      She has the following co-morbidities:        5/9/2024     8:02 PM   --   I have the following health issues associated with obesity Type II Diabetes    Pre-Diabetes    High Blood Pressure    Sleep Apnea    GERD (Reflux)   I have the following symptoms associated with obesity Fatigue       I experience a lot of shortness of breath.  I've been checked out and they couldn't find a medical reason.  Every day tasks have become harder-walking up stairs, getting up off couch, can't get comfortable in bed, etc.     Unsure if any pulmonary testing or not.        5/9/2024     8:02 PM   Referring Provider   Please name the provider who referred you to Medical Weight Management  If you do not know, please answer \"I Don't Know\" self-referral           5/9/2024     8:02 PM   Weight History   How concerned are you about your weight? Very Concerned   I became overweight After Pregnancy   The following factors have contributed to my weight gain Change in Schedule    Eating Wrong Types of Food    Eating Too Much    Lack of Exercise    Other   Please list the other factors Picky eater, hate to cook   My lowest weight since age 19 was 125   My highest weight since age 19 was 260   The most weight I have ever lost was (lbs) 0   I have the following family history of obesity/being overweight I am the only one in my immediate family who is overweight   How has your weight changed over the last year? Gained           5/9/2024     8:02 " PM   Diet Recall Review with Patient   If you do eat breakfast, what types of food do you eat? Egg, cheese, prajapati biscuit from [employer] during the week.  Diet Coke.   If you do eat lunch, what types of food do you typically eat? Pizza, bagel, cheese and crackers, chips   If you do eat supper, what types of food do you typically eat? Lots of fast food due to kids' schedules.  If home, ham, chicken, hamburger.   If you do snack, what types of food do you typically eat? Donuts, chips, cookies   How many glasses of juice do you drink in a typical day? 0   How many of glasses of milk do you drink in a typical day? 1   If you do drink milk, what type? Skim   How many 8oz glasses of sugar containing drinks such as Néstor-Aid/sweet tea do you drink in a day? 0   How many cans/bottles of sugar pop/soda/tea/sports drinks do you drink in a day? 0   How many cans/bottles of diet pop/soda/tea or sports drink do you drink in a day? 4   How often do you have a drink of alcohol? Never   Meals    Not typically a meal skipper     Water:  diet coke drinker, on week 2 of no caffeine!          5/9/2024     8:02 PM   Eating Habits   Generally, my meals include foods like these bread, pasta, rice, potatoes, corn, crackers, sweet dessert, pop, or juice Everyday   Generally, my meals include foods like these fried meats, brats, burgers, french fries, pizza, cheese, chips, or ice cream Everyday   Eat fast food (like [employer], [employer], [employer]) Almost Everyday   Eat at a buffet or sit-down restaurant A Few Times a Week   Eat most of my meals in front of the TV or computer A Few Times a Week   Often skip meals, eat at random times, have no regular eating times Almost Everyday   Rarely sit down for a meal but snack or graze throughout Less Than Weekly   Eat extra snacks between meals Everyday   Eat most of my food at the end of the day A Few Times a Week   Eat in the middle of the night or wake up at night to eat Never   Eat extra snacks  to prevent or correct low blood sugar Never   Eat to prevent acid reflux or stomach pain Never   Worry about not having enough food to eat Never   I eat when I am depressed Never   I eat when I am stressed A Few Times a Week   I eat when I am bored A Few Times a Week   I eat when I am anxious Never   I eat when I am happy or as a reward Almost Everyday   I feel hungry all the time even if I just have eaten Almost Everyday   Feeling full is important to me Everyday   I finish all the food on my plate even if I am already full Almost Everyday   I can't resist eating delicious food or walk past the good food/smell Once a Week   I eat/snack without noticing that I am eating Never   I eat when I am preparing the meal Never   I eat more than usual when I see others eating Never   I have trouble not eating sweets, ice cream, cookies, or chips if they are around the house Everyday   I think about food all day A Few Times a Week   What foods, if any, do you crave? Sweets/Candy/Chocolate   Please list any other foods you crave? I crave all of these.  Pizza.  Diet Coke.           5/9/2024     8:02 PM   Amount of Food   I feel out of control when eating Weekly   I eat a large amount of food, like a loaf of bread, a box of cookies, a pint/quart of ice cream, all at once Never   I eat a large amount of food even when I am not hungry Never   I eat rapidly Everyday   I eat alone because I feel embarrassed and do not want others to see how much I have eaten Weekly   I eat until I am uncomfortably full Never   I feel bad, disgusted, or guilty after I overeat Never     I make myself vomit what I have eaten or use laxatives to get rid of food. Never       Feeling out of control related to unsure? Occasionally may eat a whole peanut butter [employer] as an example while at desk at work. Unsure if really out of control versus proximity (if at other side of room unsure would be doing it). May or may not be weekly.    Binge Eating  Screening:  Screen below negative - pt will monitor and inform me of concerns moving forward   Binge eating episodes are associated with 3 or more of the following:    Eating until uncomfortably full. no  Eating large amounts when not physically hungry.  No  Eating much more rapidly than usual. No   Eating alone due to being embarassed by the amount eaten. No  Feeling disgusted, depressed, or guilty after overeating.  Slightly not terrible.   If patient answers yes to 3 of the above questions and answered yes to frequency, distress of eating behavior and avoids using compensatory behaviors, refer for binge eating assessment.            5/9/2024     8:02 PM   Activity/Exercise History   How much of a typical 12 hour day do you spend sitting? Most of the Day   How much of a typical 12 hour day do you spend lying down? Less Than Half the Day   How much of a typical day do you spend walking/standing? Less Than Half the Day   How many hours (not including work) do you spend on the TV/Video Games/Computer/Tablet/Phone? 4-5 Hours   What keeps you from being more active? Shortness of Breath    Lack of Time    Too tired    Unsure What To Do   How many total minutes do you spend doing some activity for the purpose of exercising when you exercise? 15-30 Minutes     No exercising right now - walking would be most enjoyable. If was to do something - walking would be it.     PAST MEDICAL HISTORY:  Past Medical History:   Diagnosis Date    Diabetes (H)     I've been fluctuating between pre-diabetes and diabetes.    Gastroesophageal reflux disease     Hypertension     Intramural leiomyoma of uterus     Menorrhagia     Migraine     Simple endometrial hyperplasia     Sleep apnea     mild does not use CPAP    Thickened endometrium     Thyroid cyst            5/9/2024     8:02 PM   Work/Social History Reviewed With Patient   My employment status is Full-Time   My job is    How much of your job is spent on the  computer or phone? Less Than 50%   How many hours do you spend commuting to work daily? 20 minutes   What is your marital status? /In a Relationship   If in a relationship, is your significant other overweight? Yes   If you have children, are they overweight? Yes   Who do you live with? , son, daughter   Who does the food shopping? Me       Social History     Tobacco Use    Smoking status: Never    Smokeless tobacco: Never   Substance Use Topics    Alcohol use: Never    Drug use: Never            5/9/2024     8:02 PM   Mental Health History Reviewed With Patient   Have you ever been physically or sexually abused? No   How often in the past 2 weeks have you felt little interest or pleasure in doing things? Not at all   Over the past 2 weeks how often have you felt down, depressed, or hopeless? Not at all           5/9/2024     8:02 PM   Questions Reviewed With Patient   How ready are you to make changes regarding your weight? Number 1 = Not ready at all to make changes up to 10 = very ready. 7   How confident are you that you can change? 1 = Not confident that you will be successful making changes up to 10 = very confident. 5           5/9/2024     8:02 PM   Sleep History Reviewed With Patient   How many hours do you sleep at night? 7     Do you SNORE loudly (louder than talking or loud enough to be heard through closed doors)? No   Do you often feel TIRED, fatigued, or sleepy during daytime? Yes   Has anyone OBSERVED you stop breathing during your sleep? No   Do you have or are you being treated for high blood PRESSURE? Yes   BMI more than 35kg/m2? Yes   AGE over 50 years old? Yes   NECK circumference > 16 inches (40cm)? Yes   GENDER: Male? No   STOP-BANG Total Score (range: 0 - 8) 6     Score 6, high risk hx JUSTUS - using CPAP regularly.     MEDICATIONS:   Current Outpatient Medications   Medication Sig Dispense Refill    lisinopril (PRINIVIL/ZESTRIL) 20 MG tablet TAKE 1 TABLET BY MOUTH EVERY DAY    "      ALLERGIES:   No Known Allergies    ROS:    HEENT  H/O glaucoma:  no  Cardiovascular  CAD:   no  Palpitations:   no  HTN:    Yes - well maintained   Gastrointestinal  GERD:   occasional  Constipation:   No  Gastroparesis:  No  Liver Dz:   no  H/O Pancreatitis:  no  H/O Gallbladder Dz: no  Psychiatric  Anxiety:   no  Depression:  no  Bipolar:  no  H/O ETOH/Drug abuse: no  H/O eating disorder: no  Endocrine  PMH/FMH of MTC or MEN2:  no  Neurologic:  H/O seizures:   no  Headaches:  no  Memory Impairment:  Maybe - would like to avoid if a side effect    H/O kidney stones:  no  Kidney disease:  no  Current birth control:  Post menopausal likely as greater than four years since menses/ablation    LABS/RECORDS REVIEWED:  Labs [facility] in [facility]    Labs:    9/2023 Chem panel WNL  Cr 0.5 eGFR >60  CBC MCH 25.9 ow WNL    6/13/23 hgA1c 6.2  TSH      BP Readings from Last 6 Encounters:   05/10/24 114/71   12/11/18 112/74   11/28/18 132/88   10/17/18 116/70   09/18/18 112/66   06/07/18 100/72       Pulse Readings from Last 6 Encounters:   05/10/24 92   12/11/18 80   11/28/18 80   10/17/18 76   09/18/18 68   03/21/18 84       PHYSICAL EXAM:  /71   Pulse 92   Ht 5' 9\" (1.753 m)   Wt 260 lb 6.4 oz (118.1 kg)   SpO2 96%   BMI 38.45 kg/m    GENERAL: Healthy, alert and no distress  EYES: Eyes grossly normal to inspection.    RESP: No audible wheeze, cough, or visible cyanosis.  No increased work of breathing. Lungs clear to auscultation  Heart: regular rate and rhythm. No significant murmurs, rubs, or gallops  SKIN: Visible skin clear.   NEURO: Mentation and speech appropriate for age.  PSYCH: Mentation appears normal, affect normal/bright, judgement and insight intact, normal speech and appearance well-groomed.      COUNSELING:   Reviewed obesity as a chronic disease and comprehensive management stratagies.      We discussed Bariatric Basics including:  -eating 3 meals daily    Weight Management Tips Handout given in " AVS      We discussed the importance of restorative sleep and stress management in maintaining a healthy weight.  We discussed insulin resistance and glycemic index as it relates to appetite and weight control.   We discussed the importance of physical activity including cardiovascular and strength training in maintaining a healthier weight and explored viable options.  Patient education of above written in AVS.      Sincerely,    Ree Sams PA-C

## 2024-05-09 NOTE — PROGRESS NOTES
"MEDICAL WEIGHT LOSS INITIAL EVALUATION  Patient accompanied by self  DIAGNOSIS:  Obese, class II     NUTRITION HISTORY:  Breakfast: prajapati egg and  cheese biscuut + Diet Coke   Lunch: mini pizza; toast bagel; cheese and crackers   Dinner: cheeseburger + cheese curds and diet Coke   Snacks: chocolate; chips   Beverage choices: Diet Coke 4-5 cans per day   Dining out: multiple times per week   Binge eating: no  Emotional eating: no  Night time eating: no    EXERCISE:  Type: none    ADDITIONAL INFORMATION: Patient feels her biggest challenges with food is that she is a picky eater and dislikes cooking. 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 Patient eats breakfast in her car, often order out for lunch and may eat fast food for dinner due to time crunch with family. Patient lives with her  and 2 kids (17 and 14). Patient does not receive help with shopping or food prep and clean up.     ANTHROPOMETRICS:  Height: 5'9\"   Weight: 260 lbs    BMI:  38.4 kg/m2  NUTRITION DIAGNOSIS:   Obese class II related to overeating and poor lifestyle habits as evidence by patient's subjective statements and  BMI of 38.4 kg/m2   NUTRITION INTERVENTIONS  Nutrition Prescription:  Recommend modified energy- nutrient intake  Implementation:  Nutrition Education (Content):  Discussed portion sizes and dining out. Patient ate 1200 calories from dining out last night and 560 calories for breakfast.   Provided: My Plate Guidelines    Nutrition Education (Application):   Patient to practice goals as stated below  Patient verbalizes understanding of diet by stating will read menu for calorie content   Expected engagement: good     Goals:  Read menu for calories when dining out   Bring cottage cheese for lunch     FOLLOW UP AND MONITORING:   Other  - follow up in 4-6 weeks.     TIME SPENT WITH PATIENT:  28 minutes   Quan Chacko, RD, LD  [facility] "   [phone] (office phone)

## 2024-05-10 ENCOUNTER — ALLIED HEALTH/NURSE VISIT (OUTPATIENT)
Dept: SURGERY | Facility: CLINIC | Age: 54
End: 2024-05-10
Payer: COMMERCIAL

## 2024-05-10 ENCOUNTER — OFFICE VISIT (OUTPATIENT)
Dept: SURGERY | Facility: CLINIC | Age: 54
End: 2024-05-10
Payer: COMMERCIAL

## 2024-05-10 ENCOUNTER — LAB (OUTPATIENT)
Dept: LAB | Facility: CLINIC | Age: 54
End: 2024-05-10
Payer: COMMERCIAL

## 2024-05-10 VITALS
BODY MASS INDEX: 38.57 KG/M2 | DIASTOLIC BLOOD PRESSURE: 71 MMHG | HEART RATE: 92 BPM | SYSTOLIC BLOOD PRESSURE: 114 MMHG | OXYGEN SATURATION: 96 % | WEIGHT: 260.4 LBS | HEIGHT: 69 IN

## 2024-05-10 DIAGNOSIS — E66.01 CLASS 2 SEVERE OBESITY WITH SERIOUS COMORBIDITY AND BODY MASS INDEX (BMI) OF 38.0 TO 38.9 IN ADULT, UNSPECIFIED OBESITY TYPE (H): Primary | ICD-10-CM

## 2024-05-10 DIAGNOSIS — R73.03 PREDIABETES: ICD-10-CM

## 2024-05-10 DIAGNOSIS — E66.01 CLASS 2 SEVERE OBESITY WITH SERIOUS COMORBIDITY AND BODY MASS INDEX (BMI) OF 38.0 TO 38.9 IN ADULT, UNSPECIFIED OBESITY TYPE (H): ICD-10-CM

## 2024-05-10 DIAGNOSIS — E66.01 CLASS 2 SEVERE OBESITY DUE TO EXCESS CALORIES WITH SERIOUS COMORBIDITY AND BODY MASS INDEX (BMI) OF 38.0 TO 38.9 IN ADULT (H): Primary | ICD-10-CM

## 2024-05-10 DIAGNOSIS — E66.812 CLASS 2 SEVERE OBESITY WITH SERIOUS COMORBIDITY AND BODY MASS INDEX (BMI) OF 38.0 TO 38.9 IN ADULT, UNSPECIFIED OBESITY TYPE (H): ICD-10-CM

## 2024-05-10 DIAGNOSIS — E66.812 CLASS 2 SEVERE OBESITY WITH SERIOUS COMORBIDITY AND BODY MASS INDEX (BMI) OF 38.0 TO 38.9 IN ADULT, UNSPECIFIED OBESITY TYPE (H): Primary | ICD-10-CM

## 2024-05-10 DIAGNOSIS — E66.812 CLASS 2 SEVERE OBESITY DUE TO EXCESS CALORIES WITH SERIOUS COMORBIDITY AND BODY MASS INDEX (BMI) OF 38.0 TO 38.9 IN ADULT (H): Primary | ICD-10-CM

## 2024-05-10 PROBLEM — G47.33 OBSTRUCTIVE SLEEP APNEA ON CPAP: Status: ACTIVE | Noted: 2020-03-13

## 2024-05-10 PROBLEM — I10 HYPERTENSION: Status: ACTIVE | Noted: 2024-05-10

## 2024-05-10 LAB
ALBUMIN SERPL BCG-MCNC: 3.9 G/DL (ref 3.5–5.2)
ALP SERPL-CCNC: 99 U/L (ref 40–150)
ALT SERPL W P-5'-P-CCNC: 49 U/L (ref 0–50)
ANION GAP SERPL CALCULATED.3IONS-SCNC: 10 MMOL/L (ref 7–15)
AST SERPL W P-5'-P-CCNC: 13 U/L (ref 0–45)
BILIRUB SERPL-MCNC: 0.3 MG/DL
BUN SERPL-MCNC: 15.1 MG/DL (ref 6–20)
CALCIUM SERPL-MCNC: 9.1 MG/DL (ref 8.6–10)
CHLORIDE SERPL-SCNC: 106 MMOL/L (ref 98–107)
CREAT SERPL-MCNC: 0.72 MG/DL (ref 0.51–0.95)
DEPRECATED HCO3 PLAS-SCNC: 25 MMOL/L (ref 22–29)
EGFRCR SERPLBLD CKD-EPI 2021: >90 ML/MIN/1.73M2
GLUCOSE SERPL-MCNC: 111 MG/DL (ref 70–99)
HBA1C MFR BLD: 6.9 %
POTASSIUM SERPL-SCNC: 3.9 MMOL/L (ref 3.4–5.3)
PROT SERPL-MCNC: 7.3 G/DL (ref 6.4–8.3)
SODIUM SERPL-SCNC: 141 MMOL/L (ref 135–145)
TSH SERPL DL<=0.005 MIU/L-ACNC: 0.9 UIU/ML (ref 0.3–4.2)

## 2024-05-10 PROCEDURE — 84443 ASSAY THYROID STIM HORMONE: CPT

## 2024-05-10 PROCEDURE — 84155 ASSAY OF PROTEIN SERUM: CPT

## 2024-05-10 PROCEDURE — 97802 MEDICAL NUTRITION INDIV IN: CPT

## 2024-05-10 PROCEDURE — 36415 COLL VENOUS BLD VENIPUNCTURE: CPT

## 2024-05-10 PROCEDURE — 84075 ASSAY ALKALINE PHOSPHATASE: CPT

## 2024-05-10 PROCEDURE — 99205 OFFICE O/P NEW HI 60 MIN: CPT | Performed by: PHYSICIAN ASSISTANT

## 2024-05-10 PROCEDURE — 82306 VITAMIN D 25 HYDROXY: CPT

## 2024-05-10 PROCEDURE — 83036 HEMOGLOBIN GLYCOSYLATED A1C: CPT

## 2024-05-10 NOTE — PATIENT INSTRUCTIONS
"Nice to talk with you today. Below is the plan discussed.-  Ree Sams PA-C     Pt Instructions:  Labs ordered.  Please call [phone] to set up a lab appt.   Read about Wegovy below, let's have labs repeated and connect on [employer]ones.   You can call your insurance and see if coverage for Wegovy, Zepbound, or Saxenda.     Goals:  I recommend eating breakfast within an hour of waking up and eating every 4-6 hours during the day and try minimize snacking between meals. Prioritizing veggies and proteins for food choices is also recommended as medically appropriate.  Recommend 64oz (2L) water daily as medically appropriate (6-8 glasses)  Recommend pursing regular exercise. 5 minutes of exercise every other day or every 2 days is a great place to start with aiming for 30 minutes 5 times a week as an end goal.  If you can, try to get some strength training twice weekly while losing weight to help preserve your muscle!      Follow up:    Call [phone] to schedule next visit in 3-4 months.     Seated Exercises for Arms and Legs  [email or web address]    WEGOVY (semaglutide)      Wegovy (semaglutide) injection 2.4 mg is an injectable prescription medicine used for adults with obesity (BMI ?30) or overweight (excess weight) (BMI ?27) who also have weight-related medical problems to help them lose weight and keep the weight off.  It is a GLP-1 agonist medication. GLP1 agonists stimulate the hormone GLP-1 in your body to allow you to feel full quickly and stay full longer.    Due to the shortage, You may need to be persistent and patient to get these initial dosages due to the shortage.  Once you are able to obtain the 0.25 and 0.5 mg dose \"8 weeks of therapy\" you can begin treatment.     Directions:  Start Wegovy (semaglutide) 0.25 mg once weekly for 4 weeks, then if tolerating increase to 0.5 mg weekly for 4 weeks, then if tolerating increase to 1 mg weekly for 4 weeks, then if tolerating increase to 1.7 mg " weekly for 4 weeks, then if tolerating increase to 2.4 mg weekly thereafter.      -Each Wegovy pen is a once weekly single-dose prefilled pen with a pen injector already built within the pen. Discard the Wegovy pen after use in sharps container.     Common Side Effects:    nausea, headache, diarrhea, stomach upset.  If these become unmanageable call or mychart.    Serious Side effects:   Pancreatitis (inflammation of the pancreas) has been associated with this type of medication, but is very rare.Symptoms of pancreatitis include: Pain in your upper stomach area which may travel to your back and be worse after eating.     Storage:   Store the prescription in the refrigerator. Once it is time to use the Wegovy pen, you can keep the pen at room temperature and it is good for up to 28 days at room temperature.     How to inject:  For a video on how to use the pen please go to:  [email or web address]-[email or web address]-to-use-the-wegovy-pen.html#itemTwo       For any questions or concerns please send a [employer] message to our team or call  [phone].  For emergencies please 911 or seek immediate medical care.     There is a small chance you may have some low blood sugar after taking the medication.   The signs of low blood sugar are:  Weakness  Shaky   Hungry  Sweating  Confusion      See below for ways to treat low blood sugar without adding in lots of extra calories.      Treating Low Blood Sugar    If you have symptoms of low blood sugar (sweating, shaking, dizzy, confused) eat 15 grams of carbs and wait 15 minutes:    Glucose Tabs are best for sugars under 70 -  Dex4 or BD Glucose tablets are good, you will need to take 3-4 of these to equal 15 grams.     One small box of raisins  4 oz fruit juice box or   cup fruit juice  1 small apple  1 small banana    cup canned fruit in water    English muffin or a slice of bread with jelly   1 low fat frozen waffle with sugar-free syrup    cup cottage cheese with   cup frozen  or fresh blueberries  1 cup skim or low-fat milk    cup whole grain cereal  4-6 crackers such as Triscuits      This medication is usually not covered by insurance and can be quite expensive. Sometimes a prior authorization is required, which may take up to 1-2 weeks for an insurance company to make a decision if they will cover the medication. Please be patient, you will be notified after a decision has been made.    Contact the nurse via [employer] or call [phone] if you have any questions or concerns. (Do not stop taking it if you don't think it's working. For some people it works without them knowing it.)     In order to get refills of this or any medication we prescribe you must be seen in the medical weight mgmt clinic every 2-4 months.    Using Your Wegovy Pen  Medicine (semaglutide)    Storing your pens  Store your pens in the refrigerator until you are ready to use them. Don't let them freeze.  Your pen may be stored at room temperature for 28 days or fewer. Just make sure the temperature doesn't get higher than 86 or lower than 46 degrees Fahrenheit.   Protect the pens from light.  Never use any pens that have .    Check your pen before use:  The liquid in the pen window should be clear and colorless. Bubbles are okay to see.   Do not use the pen if you can see the window contains any specks or it is cloudy or has changed color.  Make sure you have the medication and dose your health care provider prescribed.    Getting ready to inject:   1. Wash and dry your hands well.  2. Make sure the counter you use to place your supplies on is clean.  3. Make sure your injection time will not be interrupted by children or pets.  4. Have alcohol wipes or alcohol and cotton balls available to clean the injection site.   5. Choose your injection site. It can be on your stomach, back of upper arms, or upper legs. Remember to change your injection site each time you inject. Try to be at least 1 inch away from the  previous one. Stay at least 1 inch away from your belly button.     Inject your dose:  1. Pull off the grey cap off the pen. Throw it in the trash. Do not put the cap back on the pen.   2. Clean the injection site with alcohol.   3. Push the grey part of the pen firmly against your skin. This will start the injection.  4. When the pen is injecting, you will hear 2 clicks. The first click tells you the injection has started. The second one tells you the injection is continuing.   5. The injection is done when the yellow bar in the glass window at the bottom of the pen has stopped moving.   6. This injection is given 1 day a week. The pens come in  5 doses ranging from 0.25 mg to 2.4 mg. Each dose comes in a different color pen.  7. If you miss a dose, take is as soon as you remember. Do not take a missed dose, if it is within 2 days of the next dose.    8. Your injection may be best tolerated if given at night.     Disposing of your pens:  Dispose of your pens in a puncture-resistant container (hard plastic bottle) or Sharps container.  Check with the county you live in on how to dispose of the container.  Do not recycle the container with used pens.     Of note, you may not be able to  your medication right away. It may require a prior authorization from your insurance company. This may take a week or more.

## 2024-05-10 NOTE — PATIENT INSTRUCTIONS
My Plate    Goals:  Read menu for calories when dining out   Bring cottage cheese for lunch

## 2024-05-10 NOTE — ASSESSMENT & PLAN NOTE
Initial MWM. Referred to dietitian. Repeat labs ordered. Discussed wegovy - Discussed mechanism of action, common side effects, titration guidelines, and  discussed risks for pancreatitis/gallbladder problems/gastroparesis/low sugars/MTC/MEN2. Medication handout given in AVS    Would like to recheck labs, read about Wegovy and connect on [employer] if interested in starting after labs

## 2024-05-11 LAB — VIT D+METAB SERPL-MCNC: 20 NG/ML (ref 20–50)

## 2024-06-12 ENCOUNTER — TELEPHONE (OUTPATIENT)
Dept: SURGERY | Facility: CLINIC | Age: 54
End: 2024-06-12

## 2024-06-12 NOTE — TELEPHONE ENCOUNTER
Prior Authorization Retail Medication Request    Medication/Dose: Ozempic 0.25 and 0.5 mg    ICD code (if different than what is on RX):  [E11.9]     Previously Tried and Failed:  Diet and lifestyle changes    Rationale:  Type 2 diabetes mellitus     Insurance Name:      [employer]VX ACCESS     Insurance ID:  [id number]       Pharmacy Information (if different than what is on RX)  [id number] [place] [address]     Phone:  [phone]

## 2024-06-19 ENCOUNTER — HOSPITAL ENCOUNTER (OUTPATIENT)
Dept: ULTRASOUND IMAGING | Facility: CLINIC | Age: 54
Discharge: HOME | End: 2024-06-19
Attending: INTERNAL MEDICINE | Admitting: INTERNAL MEDICINE
Payer: COMMERCIAL

## 2024-06-19 DIAGNOSIS — E04.2 MULTINODULAR GOITER: ICD-10-CM

## 2024-06-19 PROCEDURE — 76536 US EXAM OF HEAD AND NECK: CPT

## 2024-06-20 NOTE — TELEPHONE ENCOUNTER
Retail Pharmacy Prior Authorization Team   Phone: [phone]    Prior Authorization Approval    Medication: OZEMPIC (0.25 OR 0.5 MG/DOSE) 2 MG/3ML SC SOPN  Authorization Effective Date: 6/20/2024  Authorization Expiration Date: 6/20/2025   Insurance Company:  [employer]  Which Pharmacy is filling the prescription: [facility] [id number] [place] [address]  Pharmacy Notified: YES  Patient Notified: YES **Instructed pharmacy to notify patient when script is ready to /ship.**

## 2024-06-20 NOTE — PROGRESS NOTES
"MEDICAL WEIGHT LOSS FOLLOW UP  Patient accompanied by self    DIAGNOSIS:  Obese, class II     NUTRITION HISTORY:  Breakfast: prajapati egg and  cheese biscuut + smoothie or orange juice; cereal -Ortiz Charms; bagels   Lunch: mini pizza; toast bagel; cheese and crackers   Dinner: cheeseburger + cheese curds; chick filet 8 nuggets + yogurt + side salad   Snacks: chocolate; chips   Beverage choices: dislikes water -smoothies; orange juice; lemonade   Dining out: multiple times per week     EXERCISE:  Type: walking when exercises; patient does not have an exercise regimen -discussed walking when daughter is at [employer]    ADDITIONAL INFORMATION:  Patient has stopped drinking Diet Coke. Patient has replaced Diet Coke with smoothies, orange juice and lemonade. Patient's mother was in the hospital during May so was not able to focus on food changes. Patient has started looking at menus for better choices and will start choosing the healthier choices.     Patient feels her biggest challenges with food is that she is a picky eater and dislikes cooking. 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 Patient eats breakfast in her car, often orders out for lunch and may eat fast food for dinner due to time crunch with family. Patient lives with her  and 2 kids (17 and 14). Patient does not receive help with shopping or food prep and clean up.     ANTHROPOMETRICS:  Height: 5'9\"   Initial Weight: 260 lbs    Current weight: 264 lbs   Weight Change: 4 lb increase    BMI: 38.9 kg/m2    MEDICATION FOR WEIGHT LOSS:  Ozempic-has not started; states has it available at pharmacy     EVALUATION/PROGRESS TOWARDS GOALS:  Previous Goals:  Read menu for calories when dining out -improving   Bring cottage cheese for lunch -improving     Previous Nutrition Diagnosis:  Obese class II related to overeating and poor lifestyle habits as evidence by patient's subjective statements and  BMI of 38.4 kg/m2-no change      Current " Nutrition Diagnosis:   Obese class II related to overeating and poor lifestyle habits as evidence by patient's subjective statements and  BMI of 38.9 kg/m2    INTERVENTION:    Nutrition Prescription:  Recommend modified nutrient intake by decreasing energy intake    Implementation:    Meals and Snacks: 3 meals + snacks if hungry     Nutrition Education (Content):  Discussed previous goals and determined new goals. Discussed dining out and non calorie containing fluid options (True Lemon, True Lime, True Orange, Hint)   Encouraged physical activity  Supported patient in attempted weight loss and behavior changes  Patient verbalizes understanding of weight management by stating will reduce calorie containing beverages   Expected patient engagement: fair-good     Goals:  Aim for 75% of time calorie free drinks   Aim for 500-600 kcals when dining out     Follow Up/Monitoring:  Other  -  patient to follow up in 4-6 weeks    Time Spent With Patient:  37 Minutes    Quan Chacko, RD, LD  [facility] Outpatient Dietitian/Weight Loss Clinic   [phone] (office phone)

## 2024-06-20 NOTE — TELEPHONE ENCOUNTER
Retail Pharmacy Prior Authorization Team   Phone: [phone]    PA Initiation    Medication: OZEMPIC (0.25 OR 0.5 MG/DOSE) 2 MG/3ML SC SOPN  Insurance Company: [employer]  Ph[facility]illing the [id number] [place] [facility] [place] - [address]  Filling Pharmacy Phone: [phone]  Filling Pharmacy Fax:    Start Date: 6/20/2024      Note: Due to record-high volumes, our turn-around time is taking longer than usual . We are currently 8 days behind in the pools.   We are working diligently to submit all requests in a timely manner and in the order they are received. Please only flag TRUE URGENT requests as high priority to the pool at this time.   If you have questions on status of PA's,  please send a note/message in the active PA encounter and send back to the [facility] PA pool [[phone]].    If you have questions about the turn-around time or about our process, please reach out to our supervisor Hope Lorenzo.   Thank you!   RPPA (Retail Pharmacy Prior Authorization) team

## 2024-06-21 ENCOUNTER — ALLIED HEALTH/NURSE VISIT (OUTPATIENT)
Dept: SURGERY | Facility: CLINIC | Age: 54
End: 2024-06-21
Payer: COMMERCIAL

## 2024-06-21 VITALS — BODY MASS INDEX: 38.99 KG/M2 | WEIGHT: 264 LBS

## 2024-06-21 DIAGNOSIS — E66.812 CLASS 2 SEVERE OBESITY WITH SERIOUS COMORBIDITY AND BODY MASS INDEX (BMI) OF 38.0 TO 38.9 IN ADULT, UNSPECIFIED OBESITY TYPE (H): Primary | ICD-10-CM

## 2024-06-21 DIAGNOSIS — E66.01 CLASS 2 SEVERE OBESITY WITH SERIOUS COMORBIDITY AND BODY MASS INDEX (BMI) OF 38.0 TO 38.9 IN ADULT, UNSPECIFIED OBESITY TYPE (H): Primary | ICD-10-CM

## 2024-06-21 PROCEDURE — 97803 MED NUTRITION INDIV SUBSEQ: CPT

## 2024-08-02 ENCOUNTER — ALLIED HEALTH/NURSE VISIT (OUTPATIENT)
Dept: SURGERY | Facility: CLINIC | Age: 54
End: 2024-08-02
Payer: COMMERCIAL

## 2024-08-02 ENCOUNTER — TELEPHONE (OUTPATIENT)
Dept: SURGERY | Facility: CLINIC | Age: 54
End: 2024-08-02

## 2024-08-02 VITALS — BODY MASS INDEX: 38.4 KG/M2 | WEIGHT: 260 LBS

## 2024-08-02 DIAGNOSIS — E11.9 TYPE 2 DIABETES MELLITUS (H): ICD-10-CM

## 2024-08-02 DIAGNOSIS — E66.01 CLASS 2 SEVERE OBESITY WITH SERIOUS COMORBIDITY AND BODY MASS INDEX (BMI) OF 38.0 TO 38.9 IN ADULT, UNSPECIFIED OBESITY TYPE (H): Primary | ICD-10-CM

## 2024-08-02 DIAGNOSIS — E66.812 CLASS 2 SEVERE OBESITY WITH SERIOUS COMORBIDITY AND BODY MASS INDEX (BMI) OF 38.0 TO 38.9 IN ADULT, UNSPECIFIED OBESITY TYPE (H): Primary | ICD-10-CM

## 2024-08-02 PROCEDURE — 97803 MED NUTRITION INDIV SUBSEQ: CPT

## 2024-08-02 NOTE — TELEPHONE ENCOUNTER
Patient stopped into clinic today with questions about her Ozempic pens that deliver the 0.25 mg and 0.5 mg doses.    She has completed 6 weeks of the 0.25 mg dose because she did not know she was supposed to increase.    When she called her pharmacy, they stated that she did not have an active prescription on file for the 0.5 mg dosing.    Writer re-sent this prescription to preferred pharmacy with patient in the room.    Also spent time with patient walking through doing schedule and use of pen.    No further questions at the end of the visit.    Sandi BANKS RN

## 2024-08-02 NOTE — PROGRESS NOTES
"MEDICAL WEIGHT LOSS FOLLOW UP    Patient accompanied by self     DIAGNOSIS:  Obese, class II      NUTRITION HISTORY:  Breakfast: prajapati egg and  cheese biscuut + smoothie or orange juice on the way to work; cereal -Ortiz Charms; Cheerios; Honey Bunches of Oats; bagels  Lunch: mini pizza; toast bagel; cheese and crackers; cheese quesadila   Dinner: cheeseburger + cheese curds; chick filet 8 nuggets + yogurt + side salad; ham + corn + stuffing; choosing lower calorie options when dining out   Snacks: chocolate; chips   Beverage choices: dislikes water -smoothies; orange juice; lemonade; water with flavor enhancer   Dining out: multiple times per week      EXERCISE:  Type: walking when exercises; patient does not have an exercise regimen -discussed walking when daughter is at [employer] practice and adding 5 minute movement during the day      ADDITIONAL INFORMATION: Patient feels that she is starting to think more about food choices. Patient recognizing she's feeling full and stopping eating. Patient tried flavor packets for water which is working for her. Patient likes to drink skim milk. Patient has stopped drinking Diet Coke. Patient was not aware that her dose would be increasing with Ozempic. Patient met with endocrinology and was told to add resistance training and brisk walking.      Patient feels her biggest challenges with food is that she is a picky eater and dislikes cooking. 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 Patient eats breakfast in her car, often orders out for lunch and may eat fast food for dinner due to time crunch with family. Patient lives with her  and 2 kids (17 and 14). Patient does not receive help with shopping or food prep and clean up.      ANTHROPOMETRICS:  Height: 5'9\"   Initial Weight: 260 lbs    Previous Weight: 264 lbs   Current weight: 260  Weight Change: 4 lb decrease   BMI: 38.9 kg/m2     MEDICATION FOR WEIGHT LOSS:  Ozempic -needs to get next dose     "   EVALUATION/PROGRESS TOWARDS GOALS:  Previous Goals:  Aim for 75% of time calorie free drinks -improving   Aim for 500-600 kcals when dining out -not met      Previous Nutrition Diagnosis:  Obese class II related to overeating and poor lifestyle habits as evidence by patient's subjective statements and  BMI of 38.9 kg/m2-no change      Current Nutrition Diagnosis:   Obese class II related to overeating and poor lifestyle habits as evidence by patient's subjective statements and  BMI of 38.4 kg/m2     INTERVENTION:     Nutrition Prescription:  Recommend modified nutrient intake by decreasing energy intake     Implementation:     Meals and Snacks: 3 meals + snacks if hungry      Nutrition Education (Content):  Discussed previous goals and determined new goals. Discussed dining out, protein sources, exercise    Encouraged physical activity  Supported patient in attempted weight loss and behavior changes  Patient verbalizes understanding of weight management by stating will add 10 minutes walking 3-4 times per day   Expected patient engagement: fair-good      Goals:  Add 10 minute walk 3-4 times per week  Aim for 75 grams protein per day     Follow Up/Monitoring:  Other  -  patient to follow up in 4-6 weeks     Time Spent With Patient:  35 Minutes     Quan Chacko, RD, LD  [facility] Outpatient Dietitian/Weight Loss Clinic   [phone] (office phone)

## 2024-08-07 ENCOUNTER — HOSPITAL ENCOUNTER (OUTPATIENT)
Dept: ULTRASOUND IMAGING | Facility: CLINIC | Age: 54
Discharge: HOME | End: 2024-08-07
Attending: INTERNAL MEDICINE | Admitting: INTERNAL MEDICINE
Payer: COMMERCIAL

## 2024-08-07 DIAGNOSIS — E04.2 MULTINODULAR GOITER: ICD-10-CM

## 2024-08-07 PROCEDURE — 88173 CYTOPATH EVAL FNA REPORT: CPT | Mod: TC,XU | Performed by: INTERNAL MEDICINE

## 2024-08-07 PROCEDURE — 250N000009 HC RX 250: Performed by: RADIOLOGY

## 2024-08-07 PROCEDURE — 272N000431 US BIOPSY THYROID FINE NEEDLE ASPIRATION

## 2024-08-07 PROCEDURE — 88173 CYTOPATH EVAL FNA REPORT: CPT | Mod: 26

## 2024-08-07 RX ORDER — LIDOCAINE HYDROCHLORIDE 10 MG/ML
10 INJECTION, SOLUTION EPIDURAL; INFILTRATION; INTRACAUDAL; PERINEURAL ONCE
Status: COMPLETED | OUTPATIENT
Start: 2024-08-07 | End: 2024-08-07

## 2024-08-07 RX ADMIN — LIDOCAINE HYDROCHLORIDE 10 ML: 10 INJECTION, SOLUTION EPIDURAL; INFILTRATION; INTRACAUDAL; PERINEURAL at 08:53

## 2024-08-13 LAB
PATH REPORT.COMMENTS IMP SPEC: NORMAL
PATH REPORT.COMMENTS IMP SPEC: NORMAL
PATH REPORT.FINAL DX SPEC: NORMAL
PATH REPORT.GROSS SPEC: NORMAL
PATH REPORT.MICROSCOPIC SPEC OTHER STN: NORMAL
PATH REPORT.RELEVANT HX SPEC: NORMAL

## 2024-09-01 ENCOUNTER — HEALTH MAINTENANCE LETTER (OUTPATIENT)
Age: 54
End: 2024-09-01

## 2024-09-16 NOTE — PROGRESS NOTES
2024      Return Medical Weight Management Note     Yesi Vaughan  MRN:  [id number]  :  1970    Assessment & Plan   Problem List Items Addressed This Visit       Class 2 severe obesity with serious comorbidity and body mass index (BMI) of 38.0 to 38.9 in adult, unspecified obesity type (H) - Primary     Patient was congratulated on wt loss success thus far. Healthy habits to assist with further weight loss were discussed. Yesi will continue Ozempic titration. Repeat labs with primary in November.          Relevant Medications    Semaglutide, 1 MG/DOSE, (OZEMPIC) 4 MG/3ML pen    Diabetes mellitus, type 2 (H)    Relevant Medications    Semaglutide, 1 MG/DOSE, (OZEMPIC) 4 MG/3ML pen        AOM Considerations:  Phentermine:   Avoid - reported hx of valve disease PCP note  - MVP? (But maybe following echo clear?) Monitor blood pressure with lisinopril  Topiramate:     Labs and hydration with lisinopril  GLP-1: Monitor sugars, noted nodular goiter - seeing endocrine - followed for 20+ years and no concerns for cancer and denies personal/fam hx of MTC/MEN2                   Naltrexone:      No interaction seen  Wellbutrin:         Metformin:       Labs and hydration with lisinopril  Contrave:  Qsymia:    PATIENT INSTRUCTIONS:  Pt Instructions:  Let's increase to 1 mg Ozempic. I'm sending 3 month fill with 1 refill.  We will touch base in November to see how you're doing and if we should increase to 2 mg. If you feel you need a dose change earlier - let me know on MyChart.   Keep an eye on side effects and let us know if you notice bothersome ones with the dose changes.     Goals:  I recommend eating breakfast within an hour of waking up and eating every 4-6 hours during the day and try minimize snacking between meals. Prioritizing veggies and proteins for food choices is also recommended as medically appropriate.  Focus on healthy food choices - prioritizing protein and veggies in your meals. I  recommend eating every 4-6 hours to reduce the risk of low blood sugars and try to minimize snacking between meals. Stay well hydrated though the day as well.      Follow up:    Call [phone] to schedule next visit in next available. Be in touch on [employer] for refills/concerns between visits    23 minutes spent on the date of the encounter doing chart review, history and exam, result review, counseling, developing plan of care, documentation, and further activities as noted      INTERVAL HISTORY:  Yesi returns for medical weight management follow up.  Last seen on 5/10/2024 by myself.  Plan at that time to read about Wegovy and connect on [employer] if interested in starting after labs.    Fine-needle aspiration of thyroid nodule nondiagnostic noted 8/7/2024 - has known cysts that they're monitoring and always normal. Reports no cancer.     I am just so tired and don't have motivation to exercise.    Noted the fatigue prior to Ozempic as well. At the 0.5 mg dose - since mid August. First week felt GI issues/icky but otherwise.     Spoke with primary care and saw DM educator once and will be seeing them again coming up.     WEIGHT METRICS:  Body mass index is 38.1 kg/m .   Current Weight: 258 lb (117 kg)  Last Visits Weight: 260 lb (117.9 kg)  Initial Weight (lbs): 264 lbs  Cumulative weight loss (lbs): 6  Weight Loss Percentage: 2.27%    Wt Readings from Last 10 Encounters:   09/23/24 258 lb (117 kg)   08/02/24 260 lb (117.9 kg)   06/21/24 264 lb (119.7 kg)   05/10/24 260 lb 6.4 oz (118.1 kg)   12/11/18 212 lb (96.2 kg)   11/28/18 215 lb (97.5 kg)   10/17/18 213 lb (96.6 kg)   09/18/18 211 lb (95.7 kg)   03/21/18 208 lb (94.3 kg)   12/13/17 208 lb (94.3 kg)                 9/16/2024     3:05 PM   Weight Loss Medication History Reviewed With Patient   Which weight loss medications are you currently taking on a regular basis? Ozempic   Are you having any side effects from the weight loss medication that we have  "prescribed you? Yes   If you are having side effects please describe: When I bumped up to .5 I felt 'icky' but that only lasted a week or so. No side effects now.           9/16/2024     3:05 PM   Changes and Difficulties   I have made the following changes to my diet since my last visit: Trying to notice calories.  Trying not to eat if I am full.   With regards to my diet, I am still struggling with: In the past month, I had Covid, my daughter had surgery, etc.  I wasn't as focused on good eating habits.   I have made the following changes to my activity/exercise since my last visit: I haven't changed much.  Need to work on this.   With regards to my activity/exercise, I am still struggling with: I am just so tired and don't have motivation to exercise.     Working with [employer] - working on 5 min increments. Lots going on currently in life - vacation, got COVID, daughter had surgery    LABS:  Reviewed in Epic    6/20/2024 creatinine normal  Hemoglobin A1c 7.3    BP Readings from Last 6 Encounters:   09/23/24 108/71   05/10/24 114/71   12/11/18 112/74   11/28/18 132/88   10/17/18 116/70   09/18/18 112/66       Pulse Readings from Last 6 Encounters:   09/23/24 78   05/10/24 92   12/11/18 80   11/28/18 80   10/17/18 76   09/18/18 68       PE:  /71   Pulse 78   Resp 16   Ht 5' 9\" (1.753 m)   Wt 258 lb (117 kg)   SpO2 95%   BMI 38.10 kg/m    GENERAL: Healthy, alert and no distress  RESP: No audible wheeze, cough, or visible cyanosis.  No visible retractions or increased work of breathing.    SKIN: Visible skin clear. No significant rash, abnormal pigmentation or lesions.  PSYCH: Mentation appears normal, affect normal/bright, judgement and insight intact        "

## 2024-09-23 ENCOUNTER — OFFICE VISIT (OUTPATIENT)
Dept: SURGERY | Facility: CLINIC | Age: 54
End: 2024-09-23
Payer: COMMERCIAL

## 2024-09-23 VITALS
OXYGEN SATURATION: 95 % | SYSTOLIC BLOOD PRESSURE: 108 MMHG | RESPIRATION RATE: 16 BRPM | HEIGHT: 69 IN | WEIGHT: 258 LBS | DIASTOLIC BLOOD PRESSURE: 71 MMHG | BODY MASS INDEX: 38.21 KG/M2 | HEART RATE: 78 BPM

## 2024-09-23 DIAGNOSIS — E66.812 CLASS 2 SEVERE OBESITY WITH SERIOUS COMORBIDITY AND BODY MASS INDEX (BMI) OF 38.0 TO 38.9 IN ADULT, UNSPECIFIED OBESITY TYPE (H): Primary | ICD-10-CM

## 2024-09-23 DIAGNOSIS — E11.69 TYPE 2 DIABETES MELLITUS WITH OTHER SPECIFIED COMPLICATION, WITHOUT LONG-TERM CURRENT USE OF INSULIN (H): ICD-10-CM

## 2024-09-23 DIAGNOSIS — E66.01 CLASS 2 SEVERE OBESITY WITH SERIOUS COMORBIDITY AND BODY MASS INDEX (BMI) OF 38.0 TO 38.9 IN ADULT, UNSPECIFIED OBESITY TYPE (H): Primary | ICD-10-CM

## 2024-09-23 PROBLEM — E11.9 DIABETES MELLITUS, TYPE 2 (H): Status: ACTIVE | Noted: 2024-09-23

## 2024-09-23 PROCEDURE — 99213 OFFICE O/P EST LOW 20 MIN: CPT | Performed by: PHYSICIAN ASSISTANT

## 2024-09-23 RX ORDER — ROSUVASTATIN CALCIUM 20 MG/1
20 TABLET, COATED ORAL
COMMUNITY
Start: 2024-06-24 | End: 2025-06-24

## 2024-09-23 NOTE — PATIENT INSTRUCTIONS
Nice to talk with you today. Below is the plan discussed.-  Ree Sams, LILLIAM     Pt Instructions:  Let's increase to 1 mg Ozempic. I'm sending 3 month fill with 1 refill.  We will touch base in November to see how you're doing and if we should increase to 2 mg. If you feel you need a dose change earlier - let me know on MyChart.   Keep an eye on side effects and let us know if you notice bothersome ones with the dose changes.     Goals:  I recommend eating breakfast within an hour of waking up and eating every 4-6 hours during the day and try minimize snacking between meals. Prioritizing veggies and proteins for food choices is also recommended as medically appropriate.  Focus on healthy food choices - prioritizing protein and veggies in your meals. I recommend eating every 4-6 hours to reduce the risk of low blood sugars and try to minimize snacking between meals. Stay well hydrated though the day as well.      Follow up:    Call [phone] to schedule next visit in next available. Be in touch on Mychart for refills/concerns between visits

## 2024-09-23 NOTE — ASSESSMENT & PLAN NOTE
Patient was congratulated on wt loss success thus far. Healthy habits to assist with further weight loss were discussed. Yesi will continue Ozempic titration. Repeat labs with primary in November.

## 2024-09-26 ENCOUNTER — ALLIED HEALTH/NURSE VISIT (OUTPATIENT)
Dept: SURGERY | Facility: CLINIC | Age: 54
End: 2024-09-26
Payer: COMMERCIAL

## 2024-09-26 DIAGNOSIS — E66.01 CLASS 2 SEVERE OBESITY WITH SERIOUS COMORBIDITY AND BODY MASS INDEX (BMI) OF 38.0 TO 38.9 IN ADULT, UNSPECIFIED OBESITY TYPE (H): Primary | ICD-10-CM

## 2024-09-26 DIAGNOSIS — E66.812 CLASS 2 SEVERE OBESITY WITH SERIOUS COMORBIDITY AND BODY MASS INDEX (BMI) OF 38.0 TO 38.9 IN ADULT, UNSPECIFIED OBESITY TYPE (H): Primary | ICD-10-CM

## 2024-09-26 PROCEDURE — 97803 MED NUTRITION INDIV SUBSEQ: CPT

## 2024-09-26 NOTE — PROGRESS NOTES
MEDICAL WEIGHT LOSS FOLLOW UP     Patient accompanied by self     DIAGNOSIS:  Obese, class II      NUTRITION HISTORY:  Breakfast: prajapati egg and  cheese biscuut + smoothie or orange juice on the way to work; cereal -Ortiz Charms; Cheerios; Honey Bunches of Oats; bagels; Mc prajapati egg and cheese biscuits - no Diet Coke + bottled water or orange juice or kimberly smoothie   Lunch: mini pizza; toast bagel; cheese and crackers; cheese quesadila   Dinner: cheeseburger + cheese curds; chick filet 8 nuggets + yogurt + side salad; ham + corn + stuffing; choosing lower calorie options when dining out; chicken and pasta hot dish; trying to chicken   Snacks: chocolate; chips   Beverage choices: dislikes water -smoothies; orange juice; lemonade; water with flavor enhancer   Dining out: multiple times per week      EXERCISE:  Type: walking when exercises; patient does not have an exercise regimen -discussed walking when daughter is at [employer] practice and adding 5 minute movement during the day      ADDITIONAL INFORMATION: Patient feels that she is starting to think more about food choices. Patient recognizing she's feeling full and stopping eating. Patient tried flavor packets for water which is working for her. Patient likes to drink skim milk. Patient has stopped drinking Diet Coke. Patient met with endocrinology and was told to add resistance training and brisk walking. Patient met with diabetes educator and instructed on blood glucose monitoring but has not started checking. Patient walking during recess. Patient feels needs a reset with school starting.      Patient feels her biggest challenges with food is that she is a picky eater and dislikes cooking. 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 Patient eats breakfast in her car, often orders out for lunch and may eat fast food for dinner due to time crunch with family. Patient lives with her  and 2 kids (17 and 14). Patient does not receive help with  "shopping or food prep or clean up.      ANTHROPOMETRICS:  Height: 5'9\"   Initial Weight: 260 lbs    Previous Weight: 260 lbs   Current weight: 258  Weight Change: 2 lb decrease   BMI: 38.1 kg/m2     MEDICATION FOR WEIGHT LOSS:  Ozempic        EVALUATION/PROGRESS TOWARDS GOALS:  Previous Goals:  Add 10 minutes walking 3-4 times per week-not met    Aim for 75 grams protein per day-not met       Previous Nutrition Diagnosis:  Obese class II related to overeating and poor lifestyle habits as evidence by patient's subjective statements and  BMI of 38.9 kg/m2-no change      Current Nutrition Diagnosis:   Obese class II related to overeating and poor lifestyle habits as evidence by patient's subjective statements and  BMI of 38.1 kg/m2     INTERVENTION:     Nutrition Prescription:  Recommend modified nutrient intake by decreasing energy intake     Implementation:     Meals and Snacks: 3 meals + snacks if hungry      Nutrition Education (Content):  Discussed previous goals and determined new goals. Discussed dining out, protein sources, exercise, carbohydrates 30-45 grams per meal   Encouraged physical activity  Supported patient in attempted weight loss and behavior changes  Patient verbalizes understanding of weight management by stating will add 5 minutes walking 3-4 times per week  Expected patient engagement: fair-good      Goals:  Add 5 minute walk 3-4 times per week  Pack foods for school weekly      Follow Up/Monitoring:  Other  -  patient to follow up in 6-8 weeks     Time Spent With Patient:  33 Minutes     Quan Chacko, RD, LD  [facility] Diet[facility]   [phone] (office phone)   "

## 2024-10-29 ENCOUNTER — HOSPITAL ENCOUNTER (OUTPATIENT)
Dept: ULTRASOUND IMAGING | Facility: CLINIC | Age: 54
Discharge: HOME | End: 2024-10-29
Attending: INTERNAL MEDICINE | Admitting: INTERNAL MEDICINE
Payer: COMMERCIAL

## 2024-10-29 DIAGNOSIS — E04.2 MULTINODULAR GOITER: ICD-10-CM

## 2024-10-29 PROCEDURE — 250N000009 HC RX 250: Performed by: RADIOLOGY

## 2024-10-29 PROCEDURE — 88173 CYTOPATH EVAL FNA REPORT: CPT | Mod: 26

## 2024-10-29 PROCEDURE — 96372 THER/PROPH/DIAG INJ SC/IM: CPT | Performed by: RADIOLOGY

## 2024-10-29 PROCEDURE — 272N000431 US BIOPSY THYROID FINE NEEDLE ASPIRATION

## 2024-10-29 PROCEDURE — 88173 CYTOPATH EVAL FNA REPORT: CPT | Mod: TC | Performed by: INTERNAL MEDICINE

## 2024-10-29 RX ORDER — LIDOCAINE HYDROCHLORIDE 10 MG/ML
5 INJECTION, SOLUTION EPIDURAL; INFILTRATION; INTRACAUDAL; PERINEURAL ONCE
Status: COMPLETED | OUTPATIENT
Start: 2024-10-29 | End: 2024-10-29

## 2024-10-29 RX ADMIN — LIDOCAINE HYDROCHLORIDE 5 ML: 10 INJECTION, SOLUTION EPIDURAL; INFILTRATION; INTRACAUDAL; PERINEURAL at 14:30

## 2024-11-15 NOTE — PROGRESS NOTES
2024      Return Medical Weight Management Note     Yesi Vaughan  MRN:  [id number]  :  1970    Assessment & Plan   Problem List Items Addressed This Visit       Hypertension    Relevant Medications    Semaglutide, 2 MG/DOSE, (OZEMPIC) 8 MG/3ML pen    Obstructive sleep apnea on CPAP    Relevant Medications    Semaglutide, 2 MG/DOSE, (OZEMPIC) 8 MG/3ML pen    Class 2 severe obesity with serious comorbidity and body mass index (BMI) of 38.0 to 38.9 in adult, unspecified obesity type (H) - Primary     Patient was congratulated on wt loss success thus far. Healthy habits to assist with further weight loss were discussed. Yesi will continue Ozempic and increase to 2 mg for greater weight response. Good hgA1c response from monitoring with her endocrine clinic so far!           Relevant Medications    Semaglutide, 2 MG/DOSE, (OZEMPIC) 8 MG/3ML pen    Diabetes mellitus, type 2 (H)    Relevant Medications    Semaglutide, 2 MG/DOSE, (OZEMPIC) 8 MG/3ML pen      AOM Considerations:  Phentermine:   Avoid - reported hx of valve disease PCP note  - MVP? (But maybe following echo clear?) Monitor blood pressure with lisinopril  Topiramate:     Labs and hydration with lisinopril  GLP-1: Monitor sugars, noted nodular goiter - seeing endocrine - followed for 20+ years and no concerns for cancer and denies personal/fam hx of MTC/MEN2                   Naltrexone:      No interaction seen  Wellbutrin:         Metformin:       Labs and hydration with lisinopril  Contrave:  Qsymia:    PATIENT INSTRUCTIONS:  Pt Instructions:  Increase to 2 mg Ozempic to see if greater weight response.     Goals:  Try for doing 5-10 min of walking after school and before going home. Try for  and  for 2 months and then switch/increase to , , .   Focus on healthy food choices - prioritizing protein and veggies in your meals. I recommend eating every 4-6 hours to reduce the risk of low blood  sugars and try to minimize snacking between meals. Stay well hydrated though the day as well.      Follow up:    Call [phone] to schedule next visit in next available. Be in touch on Mychart for refills/concerns between visits    26 minutes spent on the date of the encounter doing chart review, history and exam, result review, counseling, developing plan of care, documentation, and further activities as noted      INTERVAL HISTORY:  Yesi returns for medical weight management follow up.  Last seen on 9/23/2024 with myself and plan at that time for continuing Ozempic.    Still at the 1 mg dose and tolerating well.     Does report still has some increased improvement in hunger/cravings but not all the time.     Still hard time starting exercise - tired after work and wanting to sit on the couch. Feels walking could be feasible.  Could do walks at school or community center.     On chart review fine-needle aspiration from 10/29/2024 of goiter discusses benign follicular nodular disease    WEIGHT METRICS:  Body mass index is 38.19 kg/m .   Current Weight: 251 lb 3.2 oz (113.9 kg)  Last Visits Weight: 258 lb (117 kg)  Initial Weight (lbs): 264 lbs  Cumulative weight loss (lbs): 12.8  Weight Loss Percentage: 4.85%    Wt Readings from Last 10 Encounters:   11/18/24 251 lb 3.2 oz (113.9 kg)   09/23/24 258 lb (117 kg)   08/02/24 260 lb (117.9 kg)   06/21/24 264 lb (119.7 kg)   05/10/24 260 lb 6.4 oz (118.1 kg)   12/11/18 212 lb (96.2 kg)   11/28/18 215 lb (97.5 kg)   10/17/18 213 lb (96.6 kg)   09/18/18 211 lb (95.7 kg)   03/21/18 208 lb (94.3 kg)                 11/15/2024     9:46 AM   Weight Loss Medication History Reviewed With Patient   Which weight loss medications are you currently taking on a regular basis? Ozempic   Are you having any side effects from the weight loss medication that we have prescribed you? No           11/15/2024     9:46 AM   Changes and Difficulties   I have made the following changes to my  "diet since my last visit: No big changes   With regards to my diet, I am still struggling with: Not eating snacks/treats, healthy meals   I have made the following changes to my activity/exercise since my last visit: I am having trouble starting exercise.   With regards to my activity/exercise, I am still struggling with: See above       LABS:  Reviewed in Epic    6/24/2024 EGFR normal  Hga1c 7.3     BP Readings from Last 6 Encounters:   11/18/24 114/75   09/23/24 108/71   05/10/24 114/71   12/11/18 112/74   11/28/18 132/88   10/17/18 116/70       Pulse Readings from Last 6 Encounters:   11/18/24 78   09/23/24 78   05/10/24 92   12/11/18 80   11/28/18 80   10/17/18 76       PE:  /75   Pulse 78   Ht 5' 8\" (1.727 m)   Wt 251 lb 3.2 oz (113.9 kg)   SpO2 97%   BMI 38.19 kg/m    GENERAL: Healthy, alert and no distress  EYES: Eyes grossly normal to inspection.    RESP: No audible wheeze, cough, or visible cyanosis.  No increased work of breathing.    SKIN: Visible skin clear. No significant rash, abnormal pigmentation or lesions.  NEURO: Mentation and speech appropriate for age.  PSYCH: Mentation appears normal, affect normal/bright, judgement and insight intact, normal speech and appearance well-groomed.      Sincerely,      Ree Sams PA-C         "

## 2024-11-18 ENCOUNTER — OFFICE VISIT (OUTPATIENT)
Dept: SURGERY | Facility: CLINIC | Age: 54
End: 2024-11-18
Payer: COMMERCIAL

## 2024-11-18 VITALS
BODY MASS INDEX: 38.07 KG/M2 | SYSTOLIC BLOOD PRESSURE: 114 MMHG | OXYGEN SATURATION: 97 % | HEART RATE: 78 BPM | HEIGHT: 68 IN | DIASTOLIC BLOOD PRESSURE: 75 MMHG | WEIGHT: 251.2 LBS

## 2024-11-18 DIAGNOSIS — I10 HYPERTENSION, UNSPECIFIED TYPE: ICD-10-CM

## 2024-11-18 DIAGNOSIS — E66.812 CLASS 2 SEVERE OBESITY WITH SERIOUS COMORBIDITY AND BODY MASS INDEX (BMI) OF 38.0 TO 38.9 IN ADULT, UNSPECIFIED OBESITY TYPE (H): Primary | ICD-10-CM

## 2024-11-18 DIAGNOSIS — G47.33 OBSTRUCTIVE SLEEP APNEA ON CPAP: ICD-10-CM

## 2024-11-18 DIAGNOSIS — E11.9 TYPE 2 DIABETES MELLITUS WITHOUT COMPLICATION, WITHOUT LONG-TERM CURRENT USE OF INSULIN (H): ICD-10-CM

## 2024-11-18 DIAGNOSIS — E66.01 CLASS 2 SEVERE OBESITY WITH SERIOUS COMORBIDITY AND BODY MASS INDEX (BMI) OF 38.0 TO 38.9 IN ADULT, UNSPECIFIED OBESITY TYPE (H): Primary | ICD-10-CM

## 2024-11-18 PROCEDURE — 99213 OFFICE O/P EST LOW 20 MIN: CPT | Performed by: PHYSICIAN ASSISTANT

## 2024-11-18 PROCEDURE — G2211 COMPLEX E/M VISIT ADD ON: HCPCS | Performed by: PHYSICIAN ASSISTANT

## 2024-11-18 NOTE — PATIENT INSTRUCTIONS
Nice to talk with you today. Below is the plan discussed.-  Ree Sams, PAMilesC     Pt Instructions:  Increase to 2 mg Ozempic to see if greater weight response.     Goals:  Try for doing 5-10 min of walking after school and before going home. Try for Tuesdays and Thursdays for 2 months and then switch/increase to Mondays, Wednesdays, Fridays.   Focus on healthy food choices - prioritizing protein and veggies in your meals. I recommend eating every 4-6 hours to reduce the risk of low blood sugars and try to minimize snacking between meals. Stay well hydrated though the day as well.      Follow up:    Call [phone] to schedule next visit in next available. Be in touch on [employer] for refills/concerns between visits

## 2024-11-18 NOTE — ASSESSMENT & PLAN NOTE
Patient was congratulated on wt loss success thus far. Healthy habits to assist with further weight loss were discussed. Yesi will continue Ozempic and increase to 2 mg for greater weight response. Good hgA1c response from monitoring with her endocrine clinic so far!

## 2025-01-04 ENCOUNTER — HEALTH MAINTENANCE LETTER (OUTPATIENT)
Age: 55
End: 2025-01-04

## 2025-03-04 ENCOUNTER — TELEPHONE (OUTPATIENT)
Dept: SURGERY | Facility: CLINIC | Age: 55
End: 2025-03-04
Payer: COMMERCIAL

## 2025-03-04 NOTE — TELEPHONE ENCOUNTER
MTM referral from: [facility] visit (referral by provider)    MTM referral outreach attempt #2 on March 4, 2025 at 10:42 AM      Outcome: Patient not reachable after several attempts, routed to Pharmacist Team/Provider as an FYI    Use hbc for the carrier/Plan on the flowsheet      whoactually Message Sent    Anabella Louis CPhT  MTM

## 2025-04-19 ENCOUNTER — HEALTH MAINTENANCE LETTER (OUTPATIENT)
Age: 55
End: 2025-04-19

## 2025-06-11 NOTE — PROGRESS NOTES
2025      Return Medical Weight Management Note     Yesi Vaughan  MRN:  [id number]  :  1970    Assessment & Plan   Problem List Items Addressed This Visit       Obstructive sleep apnea on CPAP    Need to benefit to consider Mounjaro given that Zepbound was approved for sleep apnea treatment.    This is a weight related comorbidity that I would anticipate to improve with weight management.           Relevant Medications    tirzepatide (MOUNJARO) 2.5 MG/0.5ML SOAJ auto-injector pen    Class 2 severe obesity with serious comorbidity and body mass index (BMI) of 38.0 to 38.9 in adult, unspecified obesity type (H) - Primary    Discussed SE/risks for GLP-1a's again w/her experiences with Ozempic. She is agreeable to trying Mounjaro to see if better tolerated. She has no interest in Bariatric surgery. We reviewed recommendations for muscle conservation including eating three meals daily, good protein intake, and strength training.         Relevant Medications    tirzepatide (MOUNJARO) 2.5 MG/0.5ML SOAJ auto-injector pen    Diabetes mellitus, type 2 (H)    [place] with weight management benefit to utilize Mounjaro.  This is a weight related comorbidity that I would anticipate to improve with weight management.           Relevant Medications    tirzepatide (MOUNJARO) 2.5 MG/0.5ML SOAJ auto-injector pen        AOM Considerations:  Phentermine:   Avoid - reported hx of valve disease PCP note  - MVP? (But maybe following echo clear?) Monitor blood pressure with lisinopril  Topiramate:     Labs and hydration with lisinopril  GLP-1: Monitor sugars, noted nodular goiter - seeing endocrine - followed for 20+ years and no concerns for cancer and denies personal/fam hx of MTC/MEN2.  Previously used Ozempic but noted recurrent episodes of nausea/vomiting/diarrhea.  Discussed considerations 2025 to trial Mounjaro understanding there could be similar side effects and agreeable.  We reviewed the risks including  pancreatitis/gallbladder issues/diabetic retinopathy/gastroparesis/MTC/MEN2 association.                   Naltrexone:      No interaction seen  Wellbutrin:         Metformin:       Labs and hydration with lisinopril  Contrave:  Qsymia:    PATIENT INSTRUCTIONS:  Pt Instructions:  Start Mounjaro:  You'll start at 2.5 mg once weekly. Monitor closely for side effects and let me know if any concerns - or if needing dose change between visits.     You can take over the counter famotidine (Pepcid) 20 mg once or twice daily as needed for nausea/heartburn.      Goals:  Focus on healthy food choices - prioritizing protein and veggies in your meals. I recommend eating every 4-6 hours to reduce the risk of low blood sugars and try to minimize snacking between meals. Stay well hydrated though the day as well.  Great job with exercising - please continue to invest in yourself with regular exercise. Continue to strive for a range for 150-300 minutes of exercise for weight maintenance and incorporating strength training twice-three times a week to help preserve muscle!      Follow up:    Call [phone] to schedule next visit in next available in September. Be in touch on [employer] for refills/concerns between visits    43 minutes spent on the date of the encounter doing chart review, history and exam, result review, counseling, developing plan of care, documentation, and further activities as noted      INTERVAL HISTORY:  Yesi returns for medical weight management follow up.  Last seen on 11/18/2024 with myself and plan to continue and increase to 2 mg Ozempic.    12/18/24  PCP visit for nausea/vomiting and dx w/an ecoli infection in November so recommended pausing medication for 2 weeks. And diarrhea at with the e. Coli as well. (But prior to that infection hadn't had any symptoms).    Connected on MyChart 1/3/25 and improved so looking to restart Ozempic.    She connected on MyChart 2/28/2025 with concerns for side effects  with 1 mg Ozempic including nausea, loose stools, gurgly stomach and visit been happening for 2 weeks in a row so she felt it was related to Ozempic.  I did recommend discussing with primary care as well but ordered CMP, lipase, and screening ultrasound -lipase was normal as well as CMP.  Ultrasound was not performed.  However, she then felt that it was causing her to have vomit, diarrhea, nausea and so she had stopped it and on following up with primary care e. Coli was negative at that time for a recheck.    Since being off the medication - no more vomiting or diarrhea. She does still have occasional nausea still. She wonders if related to drinking more sparking roland/ICE/etc. She didn't do the [place] (had med bills with daughter).     Reviewed s/s for gallbladder screening (also for future reference) w/US but may pursue this now as they've met their deductible. She denied pain w/the prior episodes though she noted a day of upper abdominal discomfort a couple weeks ago but was on the left side and she was off medications at that time.     She does have some concerns with these medications being a 'fad' and long term use considerations. We acknowledged that they're new medications and reviewed some of the current known risks and discussed the typical stance for long term use of AOM medications for maintenance.     WEIGHT METRICS:  Body mass index is 39.85 kg/m .   Current Weight: 262 lb 1.6 oz (118.9 kg)  Last Visits Weight: 251 lb 3.2 oz (113.9 kg)  Initial Weight (lbs): 264 lbs  Cumulative weight loss (lbs): 1.9  Weight Loss Percentage: 0.72%    Wt Readings from Last 10 Encounters:   06/12/25 262 lb 1.6 oz (118.9 kg)   11/18/24 251 lb 3.2 oz (113.9 kg)   09/23/24 258 lb (117 kg)   08/02/24 260 lb (117.9 kg)   06/21/24 264 lb (119.7 kg)   05/10/24 260 lb 6.4 oz (118.1 kg)   12/11/18 212 lb (96.2 kg)   11/28/18 215 lb (97.5 kg)   10/17/18 213 lb (96.6 kg)   09/18/18 211 lb (95.7 kg)                 6/11/2025     9:51  AM   Weight Loss Medication History Reviewed With Patient   Which weight loss medications are you currently taking on a regular basis? None   If you are not taking a weight loss medication that was prescribed to you, please indicate why: I did not like the side effects   Are you having any side effects from the weight loss medication that we have prescribed you? Yes   If you are having side effects please describe: When on Ozempic I had bouts of diarrhea and vomiting.  Felt nauseous often.  Not sure if it was due to medication.           6/11/2025     9:51 AM   Changes and Difficulties   I have made the following changes to my diet since my last visit: Not sure   With regards to my diet, I am still struggling with: Watching calories.  Eating carbs.   I have made the following changes to my activity/exercise since my last visit: None   With regards to my activity/exercise, I am still struggling with: Getting started       LABS:  Hemoglobin A1C   Date Value Ref Range Status   05/10/2024 6.9 (H) <5.7 % Final     Comment:     Normal <5.7%   Prediabetes 5.7-6.4%    Diabetes 6.5% or higher     Note: Adopted from ADA consensus guidelines.     Creatinine   Date Value Ref Range Status   02/28/2025 0.65 0.51 - 0.95 mg/dL Final     GFR Estimate   Date Value Ref Range Status   02/28/2025 >90 >60 mL/min/1.73m2 Final     Comment:     eGFR calculated using 2021 CKD-EPI equation.     Carbon Dioxide (CO2)   Date Value Ref Range Status   02/28/2025 25 22 - 29 mmol/L Final     Chloride   Date Value Ref Range Status   02/28/2025 104 98 - 107 mmol/L Final     Urea Nitrogen   Date Value Ref Range Status   02/28/2025 17.5 6.0 - 20.0 mg/dL Final     ALT   Date Value Ref Range Status   02/28/2025 22 0 - 50 U/L Final     AST   Date Value Ref Range Status   02/28/2025 24 0 - 45 U/L Final     Vitamin D, Total (25-Hydroxy)   Date Value Ref Range Status   05/10/2024 20 20 - 50 ng/mL Final     Comment:     optimum levels     TSH   Date Value Ref  "Range Status   05/10/2024 0.90 0.30 - 4.20 uIU/mL Final        BP Readings from Last 6 Encounters:   06/12/25 125/76   11/18/24 114/75   09/23/24 108/71   05/10/24 114/71   12/11/18 112/74   11/28/18 132/88       Pulse Readings from Last 6 Encounters:   06/12/25 80   11/18/24 78   09/23/24 78   05/10/24 92   12/11/18 80   11/28/18 80       PE:  /76   Pulse 80   Ht 5' 8\" (1.727 m)   Wt 262 lb 1.6 oz (118.9 kg)   SpO2 96%   BMI 39.85 kg/m    GENERAL: Healthy, alert and no distress  EYES: Eyes grossly normal to inspection.    RESP: No audible wheeze, cough, or visible cyanosis.  No increased work of breathing.    SKIN: Visible skin clear. No significant rash, abnormal pigmentation or lesions.  NEURO: Mentation and speech appropriate for age.  PSYCH: Mentation appears normal, affect normal/bright, judgement and insight intact, normal speech and appearance well-groomed.      Sincerely,      Ree Sams PA-C       "

## 2025-06-12 ENCOUNTER — TELEPHONE (OUTPATIENT)
Dept: SURGERY | Facility: CLINIC | Age: 55
End: 2025-06-12

## 2025-06-12 ENCOUNTER — OFFICE VISIT (OUTPATIENT)
Dept: SURGERY | Facility: CLINIC | Age: 55
End: 2025-06-12
Payer: COMMERCIAL

## 2025-06-12 VITALS
HEIGHT: 68 IN | WEIGHT: 262.1 LBS | OXYGEN SATURATION: 96 % | HEART RATE: 80 BPM | DIASTOLIC BLOOD PRESSURE: 76 MMHG | SYSTOLIC BLOOD PRESSURE: 125 MMHG | BODY MASS INDEX: 39.72 KG/M2

## 2025-06-12 DIAGNOSIS — G47.33 OBSTRUCTIVE SLEEP APNEA ON CPAP: ICD-10-CM

## 2025-06-12 DIAGNOSIS — E11.9 TYPE 2 DIABETES MELLITUS WITHOUT COMPLICATION, WITHOUT LONG-TERM CURRENT USE OF INSULIN (H): ICD-10-CM

## 2025-06-12 DIAGNOSIS — E66.01 CLASS 2 SEVERE OBESITY WITH SERIOUS COMORBIDITY AND BODY MASS INDEX (BMI) OF 39.0 TO 39.9 IN ADULT, UNSPECIFIED OBESITY TYPE (H): Primary | ICD-10-CM

## 2025-06-12 DIAGNOSIS — E66.812 CLASS 2 SEVERE OBESITY WITH SERIOUS COMORBIDITY AND BODY MASS INDEX (BMI) OF 39.0 TO 39.9 IN ADULT, UNSPECIFIED OBESITY TYPE (H): Primary | ICD-10-CM

## 2025-06-12 NOTE — ASSESSMENT & PLAN NOTE
[place] with weight management benefit to utilize Mounjaro.  This is a weight related comorbidity that I would anticipate to improve with weight management.

## 2025-06-12 NOTE — TELEPHONE ENCOUNTER
Prior Authorization Retail Medication Request    Medication/Dose: Mounjaro 2.5 mg weekly    ICD code (if different than what is on RX):    Class 2 severe obesity with serious comorbidity and body mass index (BMI) of 39.0 to 39.9 in adult, unspecified obesity type (H) [E66.812, Z68.39, E66.01]  - Primary      Type 2 diabetes mellitus without complication, without long-term current use of insulin (H) [E11.9]      Obstructive sleep apnea on CPAP [G47.33]          Previously Tried and Failed:  diet and lifestyle  Ozempic    Rationale:    Phentermine:   Avoid - reported hx of valve disease PCP note 2021 Monitor blood pressure with lisinopril  Topiramate:     Caution labs and hydration with lisinopril  Metformin:       Caution labs and hydration with lisinopril   Ozempic: SE of nausea, loose stools, gurgly stomach     Hx of obesity   Ht: 5'8:  Body mass index is 39.85 kg/m . 6/12/25  Current Weight:  262 lb (118.9 kg)  6/12/25    Insurance Name:  [employer] OPEN ACCESS   Insurance ID:  [id number]       Pharmacy Information (if different than what is on RX)  Name:    [id number] [place] [address]     Phone:  [phone]

## 2025-06-12 NOTE — ASSESSMENT & PLAN NOTE
Need to benefit to consider Mounjaro given that Zepbound was approved for sleep apnea treatment.    This is a weight related comorbidity that I would anticipate to improve with weight management.

## 2025-06-12 NOTE — PATIENT INSTRUCTIONS
Nice to talk with you today. Below is the plan discussed.-  Ree Sams PA-C     Pt Instructions:  Start Mounjaro:  You'll start at 2.5 mg once weekly. Monitor closely for side effects and let me know if any concerns - or if needing dose change between visits.     You can take over the counter famotidine (Pepcid) 20 mg once or twice daily as needed for nausea/heartburn.      Goals:  Focus on healthy food choices - prioritizing protein and veggies in your meals. I recommend eating every 4-6 hours to reduce the risk of low blood sugars and try to minimize snacking between meals. Stay well hydrated though the day as well.  Great job with exercising - please continue to invest in yourself with regular exercise. Continue to strive for a range for 150-300 minutes of exercise for weight maintenance and incorporating strength training twice-three times a week to help preserve muscle!      Follow up:    Call [phone] to schedule next visit in next available in September. Be in touch on [employer] for refills/concerns between visits    Mounjaro (Tirzepatide)    Mounjaro (Tirzepatide): is an injectable prescription medicine for adults with type 2 diabetes used along with diet and exercise to improve blood sugar (glucose) and help with weight loss.    It is given as a shot once a week. It activates the body's receptors for GIP (glucose-dependent insulinotropic polypeptide) and GLP-1 (glucagon-like peptide-1) receptor, two naturally occurring hormones that help tell the brain that you are full and lower blood sugar. It also works is by slowing down the rate that food leaves your stomach. You feel bradley and will eat less.     Dosin.5 mg injected subcutaneously once weekly for 4 weeks   5 mg injected subcutaneously once weekly for 4 weeks  If needed can increase the dosage in 2.5 mg increments every 4 weeks up to 15 mg.    How to Inject:   For Video: [email or web address]-[email or web address]-use-[email or web address]    For Instructional Sheet:  [email or web address]     Side effects: Patients are advised to eat more slowly and purposefully. Give yourself less portions. You may find after starting this medication you have a new point of fullness. It is also important to stay adequately hydrated on the medication to reduce risks of some of these side effects. Many of these side effects (nausea, diarrhea, etc) occurred during dose escalation but did improve over time with continuing the medication. If side effects are unmanageable, reach out to your prescribing provider.     The risk of pancreatitis (inflammation of the pancreas) has been associated with this type of medication, but is very rare.  If you have had pancreatitis in the past, this medication may not be for you. Please let us know about any past history of pancreas problems. If you experience persistent severe abdominal pain (sometimes radiating to the back potentially accompanied by vomiting and have a fever), stop the medication and contact your provider immediately for assessment. They will do a blood test to check for pancreatitis.       How to Inject:   For Video: [email or web address]-[email or web address]-use-[email or web address]    For Instructional Sheet: [email or web address]     Savings Card:  Here is access to the Savings Card. Please sign up.   [email or web address]    For any questions or concerns please send a [employer] message to our team or call our weight management call center at [phone] during regular business hours.     There is a small chance you may have some low blood sugar after taking the medication.   The signs of low blood sugar are:  Weakness  Shaky   Hungry  Sweating  Confusion      See below for ways to treat low blood sugar without adding in lots of extra calories.      Treating Low Blood Sugar    If you have symptoms of low blood sugar (sweating, shaking, dizzy, confused) eat 15 grams of carbs and wait 15  minutes:    Glucose Tabs are best for sugars under 70 -  Dex4 or BD Glucose tablets are good, you will need to take 3-4 of these to equal 15 grams.     One small box of raisins  4 oz fruit juice box or   cup fruit juice  1 small apple  1 small banana    cup canned fruit in water    English muffin or a slice of bread with jelly   1 low fat frozen waffle with sugar-free syrup    cup cottage cheese with   cup frozen or fresh blueberries  1 cup skim or low-fat milk    cup whole grain cereal  4-6 crackers such as Triscuits      This medication is usually not covered by insurance and can be quite expensive. Sometimes a prior authorization is required, which may take up to 1-2 weeks for an insurance company to make a decision if they will cover the medication. Please be patient, you will be notified after a decision has been made.    Contact the nurse via [email or web address] or call [phone] if you have any questions or concerns. (Do not stop taking it if you don't think it's working. For some people it works without them knowing it.)     In order to get refills of this or any medication we prescribe you must be seen in the medical weight mgmt clinic every 2-4 months.      Using Your Mounjaro/Zepbound Pen  Medicine (tirzepatide)    Storing your pens  Store your pens in the refrigerator until you are ready to use them. Don't let them freeze.  Your pen may be stored at room temperature for 21 days or fewer. Just make sure the temperature doesn't get higher than 86 or lower than 46 degrees Fahrenheit.   Protect the pens from light.  Never use any pens that have .    Check your pen before use:  The liquid in the pen window should be clear or light yellow. Bubbles are okay to see.   Do not use the pen if you can see the window contains any specks, is cloudy, or has changed color.  Make sure you have the medication and dose your health care provider prescribed.    Getting ready to inject:   1. Wash and dry your hands well.  2.  Make sure the counter you use to place your supplies on is clean.  3. Make sure your injection time will not be interrupted by children or pets.  4. Have alcohol wipes or alcohol and cotton balls available to clean the injection site.   5. Choose your injection site. It can be on your stomach, back of upper arms, or upper legs. Remember to change your injection site each time you inject. Try to be at least 1 inch away from the previous one. Stay at least 1 inch away from your belly button.       Inject your dose:  1. Each pen is prefilled with one dose of medicine.    2. Pull off the grey cap at the bottom of the pen. Throw it in the trash. Do not put the cap back on the pen.   3. Make sure your pen is in the locked position. You will find the lock at the top of the pen.   4. Clean the injection site with alcohol.   5. Place the clear part of the pen against your skin. Unlock the pen by turning it to the unlock  position at the top.   6. Press and hold the purple injection button at the top of the pen. Listen for 2 clicks. The last click tells you the injection has been completed.   7. This injection is given 1 day a week. If you need to change the day you inject, there needs to be at least 3 days or 72 hours between the two doses.  8. If you miss a dose, you may take the dose within 4 days or 96 hours of the missed dose.   9. Your injection may be best tolerated if given at night.     Disposing of your pens:  Dispose of your pens in a puncture-resistant container (hard plastic bottle) or Sharps container.  Check with the county you live in on how to dispose of the container.  Do not recycle the container with used pens.       Of note, you may not be able to  your medication right away. It may require a prior authorization from your insurance company. This may take a week or more.

## 2025-06-12 NOTE — ASSESSMENT & PLAN NOTE
Discussed SE/risks for GLP-1a's again w/her experiences with Ozempic. She is agreeable to trying Mounjaro to see if better tolerated. She has no interest in Bariatric surgery. We reviewed recommendations for muscle conservation including eating three meals daily, good protein intake, and strength training.

## 2025-06-12 NOTE — Clinical Note
Can you make sure prior Auth was started?  Can you also send the GLP tip/tricks sheet? She had GI issues w/Ozempic.  Thanks!  Ree Sams PA-C

## 2025-06-13 NOTE — TELEPHONE ENCOUNTER
PA Initiation    Medication: MOUNJARO 2.5 MG/0.5ML SC SOAJ  Insurance Company: [employer] Ph: [phone]  Pharmacy Filling the Rx: [id number] [place] [facility] [place] [address]  Filling Pharmacy Phone: [phone]  Filling Pharmacy Fax:    Start Date: 6/13/2025  Retail Pharmacy Prior Authorization Team   Phone: [phone]

## 2025-06-13 NOTE — TELEPHONE ENCOUNTER
PRIOR AUTHORIZATION DENIED          Medication: MOUNJARO 2.5 MG/0.5ML SC SOAJ  Insurance Company: [employer] Ph: [phone]  Denial Date: 6/13/2025  Denial Reason(s):     Appeal Information:     Patient Notified: The clinic should notify the patient of the denial and discuss possible change in medication.

## 2025-06-16 NOTE — TELEPHONE ENCOUNTER
Pt last seen 6/12/25.  Has to have tried oral db med before can get Mounjaro.  Next visit 9/22/25.  Noted at last visit:  AOM Considerations:  Phentermine:   Avoid - reported hx of valve disease PCP note 2021 - MVP? (But maybe following echo clear?) Monitor blood pressure with lisinopril  Topiramate:     Labs and hydration with lisinopril  GLP-1: Monitor sugars, noted nodular goiter - seeing endocrine - followed for 20+ years and no concerns for cancer and denies personal/fam hx of MTC/MEN2.  Previously used Ozempic but noted recurrent episodes of nausea/vomiting/diarrhea.  Discussed considerations 6/2025 to trial Mounjaro understanding there could be similar side effects and agreeable.  We reviewed the risks including pancreatitis/gallbladder issues/diabetic retinopathy/gastroparesis/MTC/MEN2 association.                   Naltrexone:      No interaction seen  Wellbutrin:         Metformin:       Labs and hydration with lisinopril  Contrave:  Qsymia:     Plz advise - thx. Stella Guzman, MS, RD, RN

## 2025-07-01 ENCOUNTER — ANCILLARY PROCEDURE (OUTPATIENT)
Dept: ULTRASOUND IMAGING | Facility: CLINIC | Age: 55
End: 2025-07-01
Attending: PHYSICIAN ASSISTANT
Payer: COMMERCIAL

## 2025-07-01 DIAGNOSIS — E66.812 CLASS 2 SEVERE OBESITY WITH SERIOUS COMORBIDITY AND BODY MASS INDEX (BMI) OF 38.0 TO 38.9 IN ADULT, UNSPECIFIED OBESITY TYPE (H): ICD-10-CM

## 2025-07-01 DIAGNOSIS — E66.01 CLASS 2 SEVERE OBESITY WITH SERIOUS COMORBIDITY AND BODY MASS INDEX (BMI) OF 38.0 TO 38.9 IN ADULT, UNSPECIFIED OBESITY TYPE (H): ICD-10-CM

## 2025-07-01 PROCEDURE — 76705 ECHO EXAM OF ABDOMEN: CPT

## 2025-07-03 ENCOUNTER — RESULTS FOLLOW-UP (OUTPATIENT)
Dept: SURGERY | Facility: CLINIC | Age: 55
End: 2025-07-03

## 2025-07-23 ENCOUNTER — HOSPITAL ENCOUNTER (OUTPATIENT)
Dept: ULTRASOUND IMAGING | Facility: CLINIC | Age: 55
Discharge: HOME | End: 2025-07-23
Attending: INTERNAL MEDICINE
Payer: COMMERCIAL

## 2025-07-23 DIAGNOSIS — E04.2 MULTINODULAR GOITER: ICD-10-CM

## 2025-07-23 PROCEDURE — 76536 US EXAM OF HEAD AND NECK: CPT

## 2025-08-02 ENCOUNTER — HEALTH MAINTENANCE LETTER (OUTPATIENT)
Age: 55
End: 2025-08-02

## 2025-08-18 ENCOUNTER — LAB (OUTPATIENT)
Dept: LAB | Facility: CLINIC | Age: 55
End: 2025-08-18
Payer: COMMERCIAL

## 2025-08-18 DIAGNOSIS — E11.9 TYPE 2 DIABETES MELLITUS WITHOUT COMPLICATION, WITHOUT LONG-TERM CURRENT USE OF INSULIN (H): ICD-10-CM

## 2025-08-18 LAB
ANION GAP SERPL CALCULATED.3IONS-SCNC: 13 MMOL/L (ref 7–15)
BUN SERPL-MCNC: 18.7 MG/DL (ref 6–20)
CALCIUM SERPL-MCNC: 9.3 MG/DL (ref 8.8–10.4)
CHLORIDE SERPL-SCNC: 106 MMOL/L (ref 98–107)
CREAT SERPL-MCNC: 0.55 MG/DL (ref 0.51–0.95)
EGFRCR SERPLBLD CKD-EPI 2021: >90 ML/MIN/1.73M2
GLUCOSE SERPL-MCNC: 110 MG/DL (ref 70–99)
HCO3 SERPL-SCNC: 19 MMOL/L (ref 22–29)
POTASSIUM SERPL-SCNC: 4.7 MMOL/L (ref 3.4–5.3)
SODIUM SERPL-SCNC: 138 MMOL/L (ref 135–145)

## 2025-08-18 PROCEDURE — 36415 COLL VENOUS BLD VENIPUNCTURE: CPT

## 2025-08-18 PROCEDURE — 80048 BASIC METABOLIC PNL TOTAL CA: CPT

## 2025-08-19 ENCOUNTER — TELEPHONE (OUTPATIENT)
Dept: SURGERY | Facility: CLINIC | Age: 55
End: 2025-08-19
Payer: COMMERCIAL

## 2025-08-19 DIAGNOSIS — E66.01 CLASS 2 SEVERE OBESITY WITH SERIOUS COMORBIDITY AND BODY MASS INDEX (BMI) OF 39.0 TO 39.9 IN ADULT, UNSPECIFIED OBESITY TYPE (H): ICD-10-CM

## 2025-08-19 DIAGNOSIS — E66.812 CLASS 2 SEVERE OBESITY WITH SERIOUS COMORBIDITY AND BODY MASS INDEX (BMI) OF 39.0 TO 39.9 IN ADULT, UNSPECIFIED OBESITY TYPE (H): ICD-10-CM

## 2025-08-19 DIAGNOSIS — E11.9 TYPE 2 DIABETES MELLITUS WITHOUT COMPLICATION, WITHOUT LONG-TERM CURRENT USE OF INSULIN (H): Primary | ICD-10-CM

## 2025-08-21 ENCOUNTER — VIRTUAL VISIT (OUTPATIENT)
Dept: SURGERY | Facility: CLINIC | Age: 55
End: 2025-08-21
Payer: COMMERCIAL

## (undated) DEVICE — SUCTION CANISTER BEMIS HI FLOW 006772-901

## (undated) DEVICE — PACK TVT HYSTEROSCOPY SMA15HYFSE

## (undated) DEVICE — NDL SPINAL 22GA 5" QUINCKE 405148

## (undated) DEVICE — SYR 10ML FINGER CONTROL W/O NDL 309695

## (undated) DEVICE — GLOVE PROTEXIS W/NEU-THERA 8.0  2D73TE80

## (undated) DEVICE — SEAL SET MYOSURE ROD LENS SCOPE SINGLE USE 40-902

## (undated) DEVICE — LINEN TOWEL PACK X5 5464

## (undated) DEVICE — SOL NACL 0.9% IRRIG 3000ML BAG 2B7477

## (undated) DEVICE — TUBING SYS AQUILEX BLUE INFLOW AQL-110 YLW OUTFLOW AQL-111

## (undated) DEVICE — DECANTER BAG 2002S

## (undated) DEVICE — SOL NACL 0.9% IRRIG 1000ML BOTTLE 07138-09

## (undated) DEVICE — NDL 22GA 1.5"

## (undated) DEVICE — CATH INTERMITTENT CLEAN-CATH FEMALE 14FR 6" VINYL LF 420614

## (undated) DEVICE — SYR 03ML LL W/O NDL

## (undated) DEVICE — Device

## (undated) RX ORDER — FENTANYL CITRATE 50 UG/ML
INJECTION, SOLUTION INTRAMUSCULAR; INTRAVENOUS
Status: DISPENSED
Start: 2017-11-24

## (undated) RX ORDER — PROPOFOL 10 MG/ML
INJECTION, EMULSION INTRAVENOUS
Status: DISPENSED
Start: 2017-11-24

## (undated) RX ORDER — LIDOCAINE HYDROCHLORIDE 20 MG/ML
INJECTION, SOLUTION EPIDURAL; INFILTRATION; INTRACAUDAL; PERINEURAL
Status: DISPENSED
Start: 2017-11-24

## (undated) RX ORDER — KETOROLAC TROMETHAMINE 30 MG/ML
INJECTION, SOLUTION INTRAMUSCULAR; INTRAVENOUS
Status: DISPENSED
Start: 2017-11-24

## (undated) RX ORDER — PROPOFOL 10 MG/ML
INJECTION, EMULSION INTRAVENOUS
Status: DISPENSED
Start: 2017-05-17

## (undated) RX ORDER — LIDOCAINE HYDROCHLORIDE 20 MG/ML
INJECTION, SOLUTION EPIDURAL; INFILTRATION; INTRACAUDAL; PERINEURAL
Status: DISPENSED
Start: 2017-05-17

## (undated) RX ORDER — CEFAZOLIN SODIUM 2 G/100ML
INJECTION, SOLUTION INTRAVENOUS
Status: DISPENSED
Start: 2017-11-24

## (undated) RX ORDER — FENTANYL CITRATE 50 UG/ML
INJECTION, SOLUTION INTRAMUSCULAR; INTRAVENOUS
Status: DISPENSED
Start: 2017-05-17

## (undated) RX ORDER — ONDANSETRON 2 MG/ML
INJECTION INTRAMUSCULAR; INTRAVENOUS
Status: DISPENSED
Start: 2017-11-24

## (undated) RX ORDER — DEXAMETHASONE SODIUM PHOSPHATE 4 MG/ML
INJECTION, SOLUTION INTRA-ARTICULAR; INTRALESIONAL; INTRAMUSCULAR; INTRAVENOUS; SOFT TISSUE
Status: DISPENSED
Start: 2017-11-24

## (undated) RX ORDER — ONDANSETRON 2 MG/ML
INJECTION INTRAMUSCULAR; INTRAVENOUS
Status: DISPENSED
Start: 2017-05-17

## (undated) RX ORDER — DEXAMETHASONE SODIUM PHOSPHATE 4 MG/ML
INJECTION, SOLUTION INTRA-ARTICULAR; INTRALESIONAL; INTRAMUSCULAR; INTRAVENOUS; SOFT TISSUE
Status: DISPENSED
Start: 2017-05-17

## (undated) RX ORDER — CEFAZOLIN SODIUM 2 G/100ML
INJECTION, SOLUTION INTRAVENOUS
Status: DISPENSED
Start: 2017-05-17

## (undated) RX ORDER — KETOROLAC TROMETHAMINE 30 MG/ML
INJECTION, SOLUTION INTRAMUSCULAR; INTRAVENOUS
Status: DISPENSED
Start: 2017-05-17